# Patient Record
Sex: FEMALE | Race: BLACK OR AFRICAN AMERICAN | NOT HISPANIC OR LATINO | Employment: UNEMPLOYED
[De-identification: names, ages, dates, MRNs, and addresses within clinical notes are randomized per-mention and may not be internally consistent; named-entity substitution may affect disease eponyms.]

---

## 2017-02-07 ENCOUNTER — RECORDS - HEALTHEAST (OUTPATIENT)
Dept: ADMINISTRATIVE | Facility: OTHER | Age: 32
End: 2017-02-07

## 2017-03-03 ENCOUNTER — TRANSFERRED RECORDS (OUTPATIENT)
Dept: HEALTH INFORMATION MANAGEMENT | Facility: CLINIC | Age: 32
End: 2017-03-03

## 2017-03-03 LAB
ABO + RH BLD: NORMAL
ABO + RH BLD: NORMAL
BLD GP AB SCN SERPL QL: NORMAL
C TRACH DNA SPEC QL PROBE+SIG AMP: NORMAL
CULTURE MICRO: NORMAL
HBV SURFACE AG SERPL QL IA: NON REACTIVE
HEMOGLOBIN: 12.7 G/DL (ref 11.7–15.7)
HIV 1+2 AB+HIV1 P24 AG SERPL QL IA: NEGATIVE
N GONORRHOEA DNA SPEC QL PROBE+SIG AMP: NEGATIVE
RUBELLA ANTIBODY IGG QUANTITATIVE: NORMAL IU/ML
T PALLIDUM IGG SER QL: NON REACTIVE

## 2017-03-24 ENCOUNTER — TRANSFERRED RECORDS (OUTPATIENT)
Dept: HEALTH INFORMATION MANAGEMENT | Facility: CLINIC | Age: 32
End: 2017-03-24

## 2017-06-08 ENCOUNTER — TRANSFERRED RECORDS (OUTPATIENT)
Dept: HEALTH INFORMATION MANAGEMENT | Facility: CLINIC | Age: 32
End: 2017-06-08

## 2017-07-06 ENCOUNTER — TRANSFERRED RECORDS (OUTPATIENT)
Dept: HEALTH INFORMATION MANAGEMENT | Facility: CLINIC | Age: 32
End: 2017-07-06

## 2017-07-28 ENCOUNTER — OFFICE VISIT (OUTPATIENT)
Dept: OBGYN | Facility: CLINIC | Age: 32
End: 2017-07-28
Attending: OBSTETRICS & GYNECOLOGY
Payer: COMMERCIAL

## 2017-07-28 VITALS
DIASTOLIC BLOOD PRESSURE: 68 MMHG | BODY MASS INDEX: 34.02 KG/M2 | SYSTOLIC BLOOD PRESSURE: 106 MMHG | HEART RATE: 96 BPM | HEIGHT: 68 IN | WEIGHT: 224.5 LBS

## 2017-07-28 DIAGNOSIS — O34.219 PREVIOUS CESAREAN DELIVERY, ANTEPARTUM CONDITION OR COMPLICATION: ICD-10-CM

## 2017-07-28 DIAGNOSIS — O09.92 HIGH-RISK PREGNANCY IN SECOND TRIMESTER: ICD-10-CM

## 2017-07-28 PROCEDURE — 99212 OFFICE O/P EST SF 10 MIN: CPT | Mod: ZF

## 2017-07-28 ASSESSMENT — PAIN SCALES - GENERAL: PAINLEVEL: NO PAIN (0)

## 2017-07-28 NOTE — PROGRESS NOTES
Subjective   32 year old female who presents to clinic for initiation of OB care.   at 27w5d with estimated date of delivery of Oct 22, 2017 based on 1st trimester US.  Pregnancy is planned.  Presents today as a CHIDI without records which are requested today and arrive after Shruthi has departed clinic. Previous births at [facility]. Was seen for this IUP at a [facility] as was initially worked up there for recurrent pregnancy loss with normal labs.  History of previous c/s for failure to descent with 's x2.   Reviewed Co-morbids. Reviewed Genetics. Reviewed Medications. Reviewed dating ultrasound.        PERSONAL/SOCIAL HISTORY  Lives lives with their family.  Employment: Part time.  Her job involves moderate activity .  Additional items: Has been on WIC  Objective  -VS: reviewed and within normal limits   -General appearance: no acute distress, patient is comfortable   NEUROLOGICAL/PSYCHIATRIC   - Orientated x3,   -Mood and affect: : normal   Genetic/Infection questionnaire completed, risks include none    Assessment/Plan   at 27w5d  by 1st trimester US  Encounter Diagnoses   Name Primary?     High-risk pregnancy in second trimester      Previous  delivery, antepartum condition or complication          - Oriented to Practice, types of care, and how to reach a provider.   - Patient unable to complete 28 week labs today which were suggested. Agrees to labs at next visit.  - Tdap at next visit reviewed  - Patient was encouraged to continue prenatal vitamins as tolerated.    - Patient instructed to schedule EOB in 1 week and call prn questions or concerns    CARMEN Castrejon CNM

## 2017-07-28 NOTE — MR AVS SNAPSHOT
After Visit Summary   2017    Shruthi Bustos Victor Hugo    MRN: [id number]           Patient Information     Date Of Birth          1985        Visit Information        Provider Department      2017 10:30 AM Shanta Aguilera APRN CNM [facility]        Today's Diagnoses     High-risk pregnancy in second trimester        Previous  delivery, antepartum condition or complication           Follow-ups after your visit        Follow-up notes from your care team     Return in about 1 week (around 2017) for EOB, 30 min labs/pt ed.      Your next 10 appointments already scheduled     Aug 04, 2017  9:45 AM CDT   Return Obstetrics Extended with CARMEN Valentin CNM   [facility] ([facility])    [facility] [address]  [address]  [facility] [address] [phone]              Who to contact     Please call your clinic at [phone] to:    Ask questions about your health    Make or cancel appointments    Discuss your medicines    Learn about your test results    Speak to your doctor   If you have compliments or concerns about an experience at your clinic, or if you wish to file a complaint, please contact [facility] Patient Relations at [phone] or email us at [email or web address]         Additional Information About Your Visit        MyChart Information     CDP is an electronic gateway that provides easy, online access to your medical records. With CDP, you can request a clinic appointment, read your test results, renew a prescription or communicate with your care team.     To sign up for [email or web address] visit the website at [email or web address]   You will be asked to enter the access code listed below, as well as some personal information. Please follow the directions to create your username and password.     Your access code is:  [email or web address]  Expires: 10/12/2017  6:31 AM     Your access code will  in 90 days. If you need help or a new code, please contact your [facility] or call [phone] for assistance.        Care EveryWhere ID     This is your Care EveryWhere ID. This could be used by other organizations to access your [place] medical records  [id number]        Your Vitals Were     Pulse Height Last Period BMI (Body Mass Index)          96 1.727 m (5' 8\") 2017 34.14 kg/m2         Blood Pressure from Last 3 Encounters:   17 106/68   16 112/68   16 122/84    Weight from Last 3 Encounters:   17 101.8 kg (224 lb 8 oz)   06/12/15 98.4 kg (217 lb)   06/04/15 96.6 kg (213 lb)              Today, you had the following     No orders found for display       Primary Care Provider Office Phone # Fax #    Daniel Salazar [phone]       [facility]  [address]  [facility] [address]        Equal Access to Services     ZOFIA [facility] AH: Hadii feliberto Mccoy, wamanuelda lupashaadaha, qaybta kaalmada adearnoldyada, cordell ambrocio . So [facility] [phone].    ATENCIÓN: Si habla español, tiene a zurita disposición servicios gratuitos de asistencia lingüística. Llame al [phone].    We comply with applicable federal civil rights laws and [place] laws. We do not discriminate on the basis of race, color, national origin, age, disability sex, sexual orientation or gender identity.            Thank you!     Thank you for choosing [facility]  for your care. Our goal is always to provide you with excellent care. Hearing back from our patients is one way we can continue to improve our services. Please take a few minutes to complete the written survey that you may receive in the mail after your visit with us. Thank you!             Your Updated Medication List - Protect others around you: Learn how to safely use, store and " throw away your medicines at www.disposemymeds.org.          This list is accurate as of: 7/28/17  1:21 PM.  Always use your most recent med list.                   Brand Name Dispense Instructions for use Diagnosis    SM PRENATAL VITAMINS 28-0.8 MG Tabs     60 tablet    Take 1 tablet by mouth daily    Pregnant state, incidental       VITAMIN D (CHOLECALCIFEROL) PO      Take 400 Units by mouth daily

## 2017-07-28 NOTE — NURSING NOTE
Chief Complaint   Patient presents with     Prenatal Care     Transfer of care from [facility] in [place]       See ABDI Hargrove 7/28/2017

## 2017-07-28 NOTE — LETTER
2017       RE: Shruthi Gay  [address]    [place] [address]     Dear Colleague,    Thank you for referring your patient, Shruthi Gay, to the [facility] at [facility]. Please see a copy of my visit note below.    Subjective   32 year old female who presents to clinic for initiation of OB care.   at 27w5d with estimated date of delivery of Oct 22, 2017 based on 1st trimester US.  Pregnancy is planned.  Presents today as a CHIDI without records which are requested today and arrive after Shruthi has departed clinic. Previous births at . Was seen for this IUP at a [facility] as was initially worked up there for recurrent pregnancy loss with normal labs.  History of previous c/s for failure to descent with 's x2.   Reviewed Co-morbids. Reviewed Genetics. Reviewed Medications. Reviewed dating ultrasound.        PERSONAL/SOCIAL HISTORY  Lives lives with their family.  Employment: Part time.  Her job involves moderate activity .  Additional items: Has been on WIC  Objective  -VS: reviewed and within normal limits   -General appearance: no acute distress, patient is comfortable   NEUROLOGICAL/PSYCHIATRIC   - Orientated x3,   -Mood and affect: : normal   Genetic/Infection questionnaire completed, risks include none    Assessment/Plan   at 27w5d  by 1st trimester US  Encounter Diagnoses   Name Primary?     High-risk pregnancy in second trimester      Previous  delivery, antepartum condition or complication      - Oriented to Practice, types of care, and how to reach a provider.   - Patient unable to complete 28 week labs today which were suggested. Agrees to labs at next visit.  - Tdap at next visit reviewed  - Patient was encouraged to continue prenatal vitamins as tolerated.    - Patient instructed to schedule EOB in 1 week and call prn questions or concerns    CARMEN Castrejon CNM

## 2017-08-04 ENCOUNTER — OFFICE VISIT (OUTPATIENT)
Dept: OBGYN | Facility: CLINIC | Age: 32
End: 2017-08-04
Attending: OBSTETRICS & GYNECOLOGY
Payer: COMMERCIAL

## 2017-08-04 VITALS
DIASTOLIC BLOOD PRESSURE: 65 MMHG | BODY MASS INDEX: 33.65 KG/M2 | WEIGHT: 222 LBS | HEIGHT: 68 IN | SYSTOLIC BLOOD PRESSURE: 102 MMHG

## 2017-08-04 DIAGNOSIS — O09.93 HIGH-RISK PREGNANCY, THIRD TRIMESTER: Primary | ICD-10-CM

## 2017-08-04 DIAGNOSIS — Z23 NEED FOR TDAP VACCINATION: ICD-10-CM

## 2017-08-04 DIAGNOSIS — O36.63X0 LARGE FOR DATES AFFECTING MANAGEMENT OF MOTHER, THIRD TRIMESTER, NOT APPLICABLE OR UNSPECIFIED FETUS: ICD-10-CM

## 2017-08-04 DIAGNOSIS — O09.92 HIGH-RISK PREGNANCY IN SECOND TRIMESTER: ICD-10-CM

## 2017-08-04 PROBLEM — D69.6 THROMBOCYTOPENIA (H): Status: ACTIVE | Noted: 2017-08-04

## 2017-08-04 LAB
BASOPHILS # BLD AUTO: 0 10E9/L (ref 0–0.2)
BASOPHILS NFR BLD AUTO: 0 %
DEPRECATED CALCIDIOL+CALCIFEROL SERPL-MC: 21 UG/L (ref 20–75)
DIFFERENTIAL METHOD BLD: ABNORMAL
EOSINOPHIL # BLD AUTO: 0.1 10E9/L (ref 0–0.7)
EOSINOPHIL NFR BLD AUTO: 1.6 %
ERYTHROCYTE [DISTWIDTH] IN BLOOD BY AUTOMATED COUNT: 13.7 % (ref 10–15)
GLUCOSE 1H P 50 G GLC PO SERPL-MCNC: 102 MG/DL (ref 60–129)
HCT VFR BLD AUTO: 33.7 % (ref 35–47)
HGB BLD-MCNC: 11.4 G/DL (ref 11.7–15.7)
IMM GRANULOCYTES # BLD: 0 10E9/L (ref 0–0.4)
IMM GRANULOCYTES NFR BLD: 0.3 %
LYMPHOCYTES # BLD AUTO: 1.4 10E9/L (ref 0.8–5.3)
LYMPHOCYTES NFR BLD AUTO: 22.9 %
MCH RBC QN AUTO: 31.4 PG (ref 26.5–33)
MCHC RBC AUTO-ENTMCNC: 33.8 G/DL (ref 31.5–36.5)
MCV RBC AUTO: 93 FL (ref 78–100)
MONOCYTES # BLD AUTO: 0.5 10E9/L (ref 0–1.3)
MONOCYTES NFR BLD AUTO: 8.6 %
NEUTROPHILS # BLD AUTO: 4.2 10E9/L (ref 1.6–8.3)
NEUTROPHILS NFR BLD AUTO: 66.6 %
NRBC # BLD AUTO: 0 10*3/UL
NRBC BLD AUTO-RTO: 0 /100
PLATELET # BLD AUTO: 149 10E9/L (ref 150–450)
RBC # BLD AUTO: 3.63 10E12/L (ref 3.8–5.2)
T PALLIDUM IGG+IGM SER QL: NEGATIVE
WBC # BLD AUTO: 6.3 10E9/L (ref 4–11)

## 2017-08-04 PROCEDURE — 90715 TDAP VACCINE 7 YRS/> IM: CPT | Mod: ZF

## 2017-08-04 PROCEDURE — 86780 TREPONEMA PALLIDUM: CPT | Performed by: ADVANCED PRACTICE MIDWIFE

## 2017-08-04 PROCEDURE — 82950 GLUCOSE TEST: CPT | Performed by: ADVANCED PRACTICE MIDWIFE

## 2017-08-04 PROCEDURE — 82306 VITAMIN D 25 HYDROXY: CPT | Performed by: ADVANCED PRACTICE MIDWIFE

## 2017-08-04 PROCEDURE — 85025 COMPLETE CBC W/AUTO DIFF WBC: CPT | Performed by: ADVANCED PRACTICE MIDWIFE

## 2017-08-04 PROCEDURE — 36415 COLL VENOUS BLD VENIPUNCTURE: CPT | Performed by: ADVANCED PRACTICE MIDWIFE

## 2017-08-04 PROCEDURE — 99211 OFF/OP EST MAY X REQ PHY/QHP: CPT | Mod: 25

## 2017-08-04 PROCEDURE — 90471 IMMUNIZATION ADMIN: CPT

## 2017-08-04 PROCEDURE — 25000128 H RX IP 250 OP 636: Mod: ZF

## 2017-08-04 ASSESSMENT — ANXIETY QUESTIONNAIRES
6. BECOMING EASILY ANNOYED OR IRRITABLE: NOT AT ALL
5. BEING SO RESTLESS THAT IT IS HARD TO SIT STILL: NOT AT ALL
GAD7 TOTAL SCORE: 0
3. WORRYING TOO MUCH ABOUT DIFFERENT THINGS: NOT AT ALL
IF YOU CHECKED OFF ANY PROBLEMS ON THIS QUESTIONNAIRE, HOW DIFFICULT HAVE THESE PROBLEMS MADE IT FOR YOU TO DO YOUR WORK, TAKE CARE OF THINGS AT HOME, OR GET ALONG WITH OTHER PEOPLE: NOT DIFFICULT AT ALL
1. FEELING NERVOUS, ANXIOUS, OR ON EDGE: NOT AT ALL
7. FEELING AFRAID AS IF SOMETHING AWFUL MIGHT HAPPEN: NOT AT ALL
2. NOT BEING ABLE TO STOP OR CONTROL WORRYING: NOT AT ALL

## 2017-08-04 ASSESSMENT — PAIN SCALES - GENERAL: PAINLEVEL: NO PAIN (0)

## 2017-08-04 ASSESSMENT — PATIENT HEALTH QUESTIONNAIRE - PHQ9: 5. POOR APPETITE OR OVEREATING: NOT AT ALL

## 2017-08-04 NOTE — PROGRESS NOTES
SUBJECTIVE:    32 year old, female, , 28w4d,  who presents to the clinic today for a new ob visit.  Feels well. Has started PNV. Denies leaking of fluid, vaginal bleeding and contractions. Normal daily fetal movent. Denies HA, vision changes, RUQ pain, nausea and vomiting.   Estimated Date of Delivery: Oct 23, 2017   Oct 23, 2017 is calculated from 9 week ultrasound.     OTHER CONCERNS: None, reports feeling well.   ===========================================  ROS negative.   PSYCHIATRIC:  Denies mood changes, difficulty concentrating, depression, anxiety, panic attacks or post partum depression  PHQ9: Last PHQ-9 score on record= 0  Social History   Substance Use Topics     Smoking status: Never Smoker     Smokeless tobacco: Never Used     Alcohol use No     History   Drug Use No     History   Smoking Status     Never Smoker   Smokeless Tobacco     Never Used       Alcohol use No     Family History   Problem Relation Age of Onset     Family history unknown: Yes     ============================================  MEDICAL HISTORY   No Known Allergies        Current Outpatient Prescriptions:      Prenatal Vit-Fe Fumarate-FA ( PRENATAL VITAMINS) 28-0.8 MG TABS, Take 1 tablet by mouth daily, Disp: 60 tablet, Rfl: 3     VITAMIN D, CHOLECALCIFEROL, PO, Take 400 Units by mouth daily, Disp: , Rfl:     Past Medical History:   Diagnosis Date     NO ACTIVE PROBLEMS        Past Surgical History:   Procedure Laterality Date      SECTION              Obstetric History       T3      L3     SAB0   TAB0   Ectopic0   Multiple0   Live Births3       # Outcome Date GA Lbr Ezekiel/2nd Weight Sex Delivery Anes PTL Lv   7 Current            6 Term 12 39w4d 13:37 / 00:36 3.374 kg (7 lb 7 oz) F  None N CHENCHO      Name: KESHAALBANIA      Apgar1:  9                Apgar5: 9   5 Term 02/25/10 38w0d   F   N CHENCHO   4 Term 09 39w0d   M CS-Unspec  N CHENCHO   3 SAB            2 SAB            1 SAB      "          Obstetric Comments   Patient had bleeding with abdominal cramping starting about 45 minutes ago, shortly before midnight.         GYN History- Abnormal Pap Smears: none per pt                        Cervical procedures: none                        History of STI: none.    I personally reviewed the past social/family/medical and surgical history on the date of service.   I reviewed lab work done at Intake visit with patient.    OBJECTIVE:   PHYSICAL EXAM:  /65  Ht 1.727 m (5' 7.99\")  Wt 100.7 kg (222 lb)  LMP 2017  BMI 33.76 kg/m2  BMI- Body mass index is 33.76 kg/(m^2)., GENERAL:  Pleasant pregnant female, alert, cooperative and well groomed.  SKIN:  Warm and dry, without lesions or rashes  HEAD: Symmetrical features.  MOUTH:  Buccal mucosa pink, moist without lesions.  Teeth in good repair.    NECK:  Thyroid without enlargement and nodules.  Lymph nodes not palpable.   LUNGS:  Clear to auscultation.  HEART:  RRR without murmur.  ABDOMEN: Soft without masses , tenderness or organomegaly.  No CVA tenderness.  Uterus palpable at size greater than dates.  Well healed  scar noted. Fetal heart tones present.  MUSCULOSKELETAL:  Full range of motion  EXTREMITIES:  No edema. No significant varicosities.   PELVIC EXAM: deferred  GC/CHLAMYDIA CULTURE OBTAINED:NO, neg/neg 3/2017    ASSESSMENT:  Intrauterine pregnancy 28w4d size greater than dates  Genetic Screening: declined     PLAN:  Orders Placed This Encounter   Procedures     Growth Ultrasound 37420     TDAP VACCINE (BOOSTRIX) >/= 27 weeks     Glucose tolerance gest screen 1 hour     CBC with Platelets Differential [WGR237]     25- OH-Vitamin D     Anti Treponema [[id number]]       - Reviewed use of triage nurse line and contacting the on-call provider after hours for an urgent need such as fever, vagina bleeding, bladder or vaginal infection, rupture of membranes,  or term labor.    - Reviewed best evidence for: weight gain for her " weight and height for pregnancy:  RECOMMENDED WEIGHT GAIN: < 15 lbs.  -Reviewed healthy diet and foods to avoid; exercise and activity during pregnancy;avoiding exposure to toxoplasmosis; and maintenance of a generally healthy lifestyle.   -EOB Education completed today includes breast feeding, [facility] hand out , contraception, counting movements, signs of pre-term labor, when to present to birthplace, post partum depression, GBS, getting enough iron and labor induction.  -Birth preferences reviewed: Un-Medicated, Labor support:  Family,  Feeding plans : Breastfeed, Contraception planned:  unsure  -Water birth consent form was not given.  - All pt's questions discussed and answered.  Pt verbalized understanding of and agreement to plan of care.   - RTC asap for growth ultrasound (pt unable to complete today), then 3-4 weeks for VINAYAK. Please review TOLAC consent at next visit.     CARMEN Hill CNM

## 2017-08-04 NOTE — MR AVS SNAPSHOT
"              After Visit Summary   8/4/2017    Shruthi Bustos Victor Hugo    MRN: [id number]           Patient Information     Date Of Birth          1985        Visit Information        Provider Department      8/4/2017 9:45 AM Ryanne Vigil APRN CNM [facility]        Today's Diagnoses     High-risk pregnancy, third trimester    -  1    Need for Tdap vaccination        Large for dates affecting management of mother, third trimester, not applicable or unspecified fetus        High-risk pregnancy in second trimester          Care Instructions      Thank you for choosing [facility] for your obstetrical care.  We are an integrated health clinic with obstetricians, midwives, a psychologist, an acupuncturist, a nutritionist, a pharmacist, internal medicine and family practice all in one.  If you have questions about services offered please ask.      o Please keep all appointments with your provider.  You will help catch, prevent and treat problems early.  o Review your Expectant Family booklet and folder given to you at this intake visit.  It can answer many basic questions including:    Treatment for nausea and vomiting    Medications that are safe in pregnancy    Healthy diet and weight gain    Exercise and activity  o ASK questions!!  Please use \"Questions for my New OB visit\" form to write down any questions or concerns for your next visit.  o Eat a healthy diet.  Visit www.choosemyplate.gov and click on  Pregnancy and Breastfeeding  for information and tips  o Do not smoke.  Avoid other people's smoke, too.  We are happy to help with referrals to stop smoking programs.  o Do not drink alcohol.  o Try to avoid people who have colds or other infections.  Practice good hand washing.  o Consider registering for our Healthy Pregnancy Class here at [facility].  This class is offered every 3rd Wednesday from 2:30-4:30 p.m.  [place] at [phone] or online at " [email or web address] or [email or web address]  o Consider registering for prenatal education classes through [employer] at [facility].  You can view class schedules and register online at [email or web address] or call [phone] for questions     For urgent concerns, call [facility] at [phone] to speak with a triage nurse during regular clinic hours (8:00 am - 4:30 pm).  This line is answered by our service 24 hours a day, after hours a provider will return your call.          Follow-ups after your visit        Follow-up notes from your care team     Return in about 4 weeks (around 9/1/2017) for Return OB.      Your next 10 appointments already scheduled     Aug 11, 2017 11:00 AM CDT   ULTRASOUND with [facility] ULTRASOUND   [facility] ([facility])    [facility] [address]  [address]  [facility] [address] [phone]            Sep 01, 2017 11:00 AM CDT   RETURN OB with CARMEN Narvaez CNM   [facility] ([facility])    [facility] [address]  [address]  [facility] [address] [phone]              Future tests that were ordered for you today     Open Future Orders        Priority Expected Expires Ordered    Growth Ultrasound 89163 Routine  12/2/2017 8/4/2017            Who to contact     Please call your clinic at [phone] to:    Ask questions about your health    Make or cancel appointments    Discuss your medicines    Learn about your test results    Speak to your doctor   If you have compliments or concerns about an experience at your clinic, or if you wish to file a complaint, please contact [facility] Patient Relations at [phone] or email us at [email or web address]         Additional Information About Your Visit        Care EveryWhere ID     This is your Care EveryWhere ID. This  "could be used by other organizations to access your [place] medical records  [id number]        Your Vitals Were     Height Last Period BMI (Body Mass Index)             1.727 m (5' 7.99\") 01/22/2017 33.76 kg/m2          Blood Pressure from Last 3 Encounters:   08/04/17 102/65   07/28/17 106/68   03/12/16 112/68    Weight from Last 3 Encounters:   08/04/17 100.7 kg (222 lb)   07/28/17 101.8 kg (224 lb 8 oz)   06/12/15 98.4 kg (217 lb)              We Performed the Following     25- OH-Vitamin D     Anti Treponema [[id number]]     CBC with Platelets Differential [[id number]]     Glucose tolerance gest screen 1 hour     TDAP VACCINE (BOOSTRIX) >/= 27 weeks        Primary Care Provider Office Phone # Fax #    Daniel Salazar [phone]       [facility] [address] [facility] [address]        Equal Access to Services     CARMELINA MONAE : Hadii aad ku hadasho Soomaali, waaxda luqadaha, qaybta kaalmada adeegyada, waxay karen ambrocio . So [facility] [phone].    ATENCIÓN: Si habla español, tiene a zurita disposición servicios gratuitos de asistencia lingüística. Jagrutiame al [phone].    We comply with applicable federal civil rights laws and [place] laws. We do not discriminate on the basis of race, color, national origin, age, disability sex, sexual orientation or gender identity.            Thank you!     Thank you for choosing [facility]  for your care. Our goal is always to provide you with excellent care. Hearing back from our patients is one way we can continue to improve our services. Please take a few minutes to complete the written survey that you may receive in the mail after your visit with us. Thank you!             Your Updated Medication List - Protect others around you: Learn how to safely use, store and throw away your medicines at www.disposemymeds.org.          This list is accurate as of: 8/4/17 12:11 PM.  Always use your most recent med list. "                   Brand Name Dispense Instructions for use Diagnosis    SM PRENATAL VITAMINS 28-0.8 MG Tabs     60 tablet    Take 1 tablet by mouth daily    Pregnant state, incidental       VITAMIN D (CHOLECALCIFEROL) PO      Take 400 Units by mouth daily

## 2017-08-04 NOTE — LETTER
2017       RE: Shruthi Gay  [address]    [place] [address]     Dear Colleague,    Thank you for referring your patient, Shruthi Gay, to the [facility] at [facility]. Please see a copy of my visit note below.    SUBJECTIVE:    32 year old, female, , 28w4d,  who presents to the clinic today for a new ob visit.  Feels well. Has started PNV. Denies leaking of fluid, vaginal bleeding and contractions. Normal daily fetal movent. Denies HA, vision changes, RUQ pain, nausea and vomiting.   Estimated Date of Delivery: Oct 23, 2017   Oct 23, 2017 is calculated from 9 week ultrasound.     OTHER CONCERNS: None, reports feeling well.   ===========================================  ROS negative.   PSYCHIATRIC:  Denies mood changes, difficulty concentrating, depression, anxiety, panic attacks or post partum depression  PHQ9: Last PHQ-9 score on record= 0  Social History   Substance Use Topics     Smoking status: Never Smoker     Smokeless tobacco: Never Used     Alcohol use No     History   Drug Use No     History   Smoking Status     Never Smoker   Smokeless Tobacco     Never Used     Alcohol use No     Family History   Problem Relation Age of Onset     Family history unknown: Yes     ============================================  MEDICAL HISTORY   No Known Allergies    Current Outpatient Prescriptions:      Prenatal Vit-Fe Fumarate-FA ( PRENATAL VITAMINS) 28-0.8 MG TABS, Take 1 tablet by mouth daily, Disp: 60 tablet, Rfl: 3     VITAMIN D, CHOLECALCIFEROL, PO, Take 400 Units by mouth daily, Disp: , Rfl:     Past Medical History:   Diagnosis Date     NO ACTIVE PROBLEMS      Past Surgical History:   Procedure Laterality Date      SECTION       Obstetric History       T3      L3     SAB0   TAB0   Ectopic0   Multiple0   Live Births3       # Outcome Date GA Lbr Ezekiel/2nd Weight Sex Delivery Anes PTL Lv   7  "Current            6 Term 12 39w4d 13:37 / 00:36 3.374 kg (7 lb 7 oz) F  None N CHENCHO      Name: ALBANIA REBOLLEDO      Apgar1:  9                Apgar5: 9   5 Term 02/25/10 38w0d   F   N CHENCHO   4 Term 09 39w0d   M CS-Unspec  N CHENCHO   3 SAB            2 SAB            1 SAB               Obstetric Comments   Patient had bleeding with abdominal cramping starting about 45 minutes ago, shortly before midnight.       GYN History- Abnormal Pap Smears: none per pt                        Cervical procedures: none                        History of STI: none.    I personally reviewed the past social/family/medical and surgical history on the date of service.   I reviewed lab work done at Intake visit with patient.    OBJECTIVE:   PHYSICAL EXAM:  /65  Ht 1.727 m (5' 7.99\")  Wt 100.7 kg (222 lb)  LMP 2017  BMI 33.76 kg/m2  BMI- Body mass index is 33.76 kg/(m^2)., GENERAL:  Pleasant pregnant female, alert, cooperative and well groomed.  SKIN:  Warm and dry, without lesions or rashes  HEAD: Symmetrical features.  MOUTH:  Buccal mucosa pink, moist without lesions.  Teeth in good repair.    NECK:  Thyroid without enlargement and nodules.  Lymph nodes not palpable.   LUNGS:  Clear to auscultation.  HEART:  RRR without murmur.  ABDOMEN: Soft without masses , tenderness or organomegaly.  No CVA tenderness.  Uterus palpable at size greater than dates.  Well healed  scar noted. Fetal heart tones present.  MUSCULOSKELETAL:  Full range of motion  EXTREMITIES:  No edema. No significant varicosities.   PELVIC EXAM: deferred  GC/CHLAMYDIA CULTURE OBTAINED:NO, neg/neg 3/2017    ASSESSMENT:  Intrauterine pregnancy 28w4d size greater than dates  Genetic Screening: declined     PLAN:  Orders Placed This Encounter   Procedures     Growth Ultrasound 03426     TDAP VACCINE (BOOSTRIX) >/= 27 weeks     Glucose tolerance gest screen 1 hour     CBC with Platelets Differential [ATT404]     25- OH-Vitamin D     " Anti Treponema [DTY7872]       - Reviewed use of triage nurse line and contacting the on-call provider after hours for an urgent need such as fever, vagina bleeding, bladder or vaginal infection, rupture of membranes,  or term labor.    - Reviewed best evidence for: weight gain for her weight and height for pregnancy:  RECOMMENDED WEIGHT GAIN: < 15 lbs.  -Reviewed healthy diet and foods to avoid; exercise and activity during pregnancy;avoiding exposure to toxoplasmosis; and maintenance of a generally healthy lifestyle.   -EOB Education completed today includes breast feeding, [facility] hand out , contraception, counting movements, signs of pre-term labor, when to present to birthplace, post partum depression, GBS, getting enough iron and labor induction.  -Birth preferences reviewed: Un-Medicated, Labor support:  Family,  Feeding plans : Breastfeed, Contraception planned:  unsure  -Water birth consent form was not given.  - All pt's questions discussed and answered.  Pt verbalized understanding of and agreement to plan of care.   - RTC asap for growth ultrasound (pt unable to complete today), then 3-4 weeks for VINAYAK. Please review TOLAC consent at next visit.     CARMEN Hill CNM

## 2017-08-04 NOTE — PATIENT INSTRUCTIONS
"  Thank you for choosing [facility] for your obstetrical care.  We are an integrated health clinic with obstetricians, midwives, a psychologist, an acupuncturist, a nutritionist, a pharmacist, internal medicine and family practice all in one.  If you have questions about services offered please ask.      o Please keep all appointments with your provider.  You will help catch, prevent and treat problems early.  o Review your Expectant Family booklet and folder given to you at this intake visit.  It can answer many basic questions including:    Treatment for nausea and vomiting    Medications that are safe in pregnancy    Healthy diet and weight gain    Exercise and activity  o ASK questions!!  Please use \"Questions for my New OB visit\" form to write down any questions or concerns for your next visit.  o Eat a healthy diet.  Visit www.choosemyplate.gov and click on  Pregnancy and Breastfeeding  for information and tips  o Do not smoke.  Avoid other people's smoke, too.  We are happy to help with referrals to stop smoking programs.  o Do not drink alcohol.  o Try to avoid people who have colds or other infections.  Practice good hand washing.  o Consider registering for our Healthy Pregnancy Class here at [facility].  This class is offered every 3rd Wednesday from 2:30-4:30 p.m.  [place] at [phone] or online at [email or web address]  o Consider registering for prenatal education classes through [place] at [facility].  You can view class schedules and register online at [email or web address] or call [phone] for questions     For urgent concerns, call [facility] at [phone] to speak with a triage nurse during regular clinic hours (8:00 am - 4:30 pm).  This line is answered by our service 24 hours a day, after hours a provider will return your call.  "

## 2017-08-05 ASSESSMENT — PATIENT HEALTH QUESTIONNAIRE - PHQ9: SUM OF ALL RESPONSES TO PHQ QUESTIONS 1-9: 0

## 2017-08-05 ASSESSMENT — ANXIETY QUESTIONNAIRES: GAD7 TOTAL SCORE: 0

## 2017-08-11 ENCOUNTER — OFFICE VISIT (OUTPATIENT)
Dept: OBGYN | Facility: CLINIC | Age: 32
End: 2017-08-11
Attending: MIDWIFE
Payer: COMMERCIAL

## 2017-08-11 DIAGNOSIS — O36.63X0 LARGE FOR DATES AFFECTING MANAGEMENT OF MOTHER, THIRD TRIMESTER, NOT APPLICABLE OR UNSPECIFIED FETUS: ICD-10-CM

## 2017-08-11 PROCEDURE — 76816 OB US FOLLOW-UP PER FETUS: CPT | Mod: ZF

## 2017-08-11 NOTE — PROGRESS NOTES
32 year old female, , presents at 29 4/7 weeks for obstetric ultrasound assessment indicated by size greater than dates.    Single fetus    Presentation - cephalic    USEGA = 31 3/7 weeks.  EFW = 1,683 grams, 84 % for 29 weeks.      NICHO = 14.1cm.  FHR = 147bpm     Placenta anterior and grade 0      Findings discussed with patient.    Further studies as clinically indicated.    REBEKAH Arboleda MD MPH

## 2017-08-11 NOTE — LETTER
2017       RE: Shruthi Gay  [address]    [place] [address]     Dear Colleague,    Thank you for referring your patient, Shruthi Gay, to the [facility] at [facility]. Please see a copy of my visit note below.    32 year old female, , presents at 29 4/7 weeks for obstetric ultrasound assessment indicated by size greater than dates.    Single fetus    Presentation - cephalic    USEGA = 31 3/7 weeks.  EFW = 1,683 grams, 84 % for 29 weeks.      NICHO = 14.1cm.  FHR = 147bpm     Placenta anterior and grade 0      Findings discussed with patient.    Further studies as clinically indicated.    REBEKAH Arboleda MD MPH      Again, thank you for allowing me to participate in the care of your patient.      Sincerely,    [facility]

## 2017-08-11 NOTE — MR AVS SNAPSHOT
After Visit Summary   8/11/2017    Shruthi Bustos Victor Hugo    MRN: [id number]           Patient Information     Date Of Birth          1985        Visit Information        Provider Department      8/11/2017 11:00 AM [facility] ULTRASOUND [facility]        Today's Diagnoses     Large for dates affecting management of mother, third trimester, not applicable or unspecified fetus           Follow-ups after your visit        Your next 10 appointments already scheduled     Sep 01, 2017 11:00 AM CDT   RETURN OB with CARMEN Narvaez CNM   [facility] ([facility])    [facility] [address]  [address] [facility] [address] [phone]              Who to contact     Please call your clinic at [phone] to:    Ask questions about your health    Make or cancel appointments    Discuss your medicines    Learn about your test results    Speak to your doctor   If you have compliments or concerns about an experience at your clinic, or if you wish to file a complaint, please contact [facility] Patient Relations at [phone] or email us at [email or web address]         Additional Information About Your Visit        Care EveryWhere ID     This is your Care EveryWhere ID. This could be used by other organizations to access your [facility] medical records  [id number]        Your Vitals Were     Last Period                   01/22/2017            Blood Pressure from Last 3 Encounters:   08/04/17 102/65   07/28/17 106/68   03/12/16 112/68    Weight from Last 3 Encounters:   08/04/17 100.7 kg (222 lb)   07/28/17 101.8 kg (224 lb 8 oz)   06/12/15 98.4 kg (217 lb)              We Performed the Following     Growth Ultrasound [address]4544        Primary Care Provider Office Phone # Fax #    Daniel Salazar [phone]       [facility] [address] [facility] [address]        Equal  Access to Services     [facility]: Hadii aad ku hadrafythai Antoinetteray, wamanuelda luqadaha, qamarilyn pashabetsycordell thakkar. So [facility] [phone].    ATENCIÓN: Si habla español, tiene a zurita disposición servicios gratuitos de asistencia lingüística. Llame al [phone].    We comply with applicable federal civil rights laws and [place] laws. We do not discriminate on the basis of race, color, national origin, age, disability sex, sexual orientation or gender identity.            Thank you!     Thank you for choosing [facility]  for your care. Our goal is always to provide you with excellent care. Hearing back from our patients is one way we can continue to improve our services. Please take a few minutes to complete the written survey that you may receive in the mail after your visit with us. Thank you!             Your Updated Medication List - Protect others around you: Learn how to safely use, store and throw away your medicines at www.disposemymeds.org.          This list is accurate as of: 8/11/17 12:59 PM.  Always use your most recent med list.                   Brand Name Dispense Instructions for use Diagnosis    SM PRENATAL VITAMINS 28-0.8 MG Tabs     60 tablet    Take 1 tablet by mouth daily    Pregnant state, incidental       VITAMIN D (CHOLECALCIFEROL) PO      Take 400 Units by mouth daily

## 2017-09-06 ENCOUNTER — OFFICE VISIT (OUTPATIENT)
Dept: OBGYN | Facility: CLINIC | Age: 32
End: 2017-09-06
Attending: ADVANCED PRACTICE MIDWIFE
Payer: COMMERCIAL

## 2017-09-06 VITALS
SYSTOLIC BLOOD PRESSURE: 99 MMHG | BODY MASS INDEX: 35.77 KG/M2 | WEIGHT: 236 LBS | HEIGHT: 68 IN | HEART RATE: 97 BPM | DIASTOLIC BLOOD PRESSURE: 65 MMHG

## 2017-09-06 DIAGNOSIS — Z33.1 PREGNANT STATE, INCIDENTAL: ICD-10-CM

## 2017-09-06 DIAGNOSIS — O09.92 HIGH-RISK PREGNANCY IN SECOND TRIMESTER: Primary | ICD-10-CM

## 2017-09-06 DIAGNOSIS — D69.6 THROMBOCYTOPENIA (H): ICD-10-CM

## 2017-09-06 PROCEDURE — 99212 OFFICE O/P EST SF 10 MIN: CPT | Mod: ZF

## 2017-09-06 RX ORDER — ACETAMINOPHEN 325 MG/1
650 TABLET ORAL EVERY 6 HOURS PRN
Qty: 100 TABLET | Refills: 0 | Status: SHIPPED | OUTPATIENT
Start: 2017-09-06

## 2017-09-06 RX ORDER — GLYCERIN/MINERAL OIL
1 LOTION (ML) TOPICAL DAILY
Qty: 60 TABLET | Refills: 3 | Status: SHIPPED | OUTPATIENT
Start: 2017-09-06

## 2017-09-06 ASSESSMENT — PAIN SCALES - GENERAL: PAINLEVEL: NO PAIN (0)

## 2017-09-06 NOTE — LETTER
"2017     RE: Shruthi Gay  [address]    [place] [address]     Dear Colleague,    Thank you for referring your patient, Shruthi Gay, to the [facility] at [facility]. Please see a copy of my visit note below.    Subjective:      32 year old  at 33w2d presents for a routine prenatal appointment.   Denies vaginal bleeding or leakage of fluid.  Denies contractions. + fetal movement.      No HA, visual changes, RUQ or epigastric pain.   Patient concerns: reports lower back pain, improved with massage.  Reviewed EOB labs with patient- low platelets discussed.    Objective:  Vitals:    17 1516   BP: 99/65   Pulse: 97   Weight: 107 kg (236 lb)   Height: 1.727 m (5' 8\")   , see ob flowsheet    Assessment/Plan:   Encounter Diagnoses   Name Primary?     High-risk pregnancy in third trimester Yes     Pregnant state, incidental      Orders Placed This Encounter   Medications     Prenatal Vit-Fe Fumarate-FA ( PRENATAL VITAMINS) 28-0.8 MG TABS     Sig: Take 1 tablet by mouth daily     Dispense:  60 tablet     Refill:  3     order for DME     Sig: Maternity Belt order     Dispense:  1 each     Refill:  0     acetaminophen (TYLENOL) 325 MG tablet     Sig: Take 2 tablets (650 mg) by mouth every 6 hours as needed for mild pain     Dispense:  100 tablet     Refill:  0     ABO   Date Value Ref Range Status   2017 A  Final     RH(D)   Date Value Ref Range Status   2017 Pos  Final     Antibody Screen   Date Value Ref Range Status   2017 neg  Final     - Discussed heating pad, maternity support belt, warm baths for lower back pain.   - Reviewed total weight gain, encouraged continued healthy diet and exercise.    - Reviewed importance of daily fetal kick count and why/how to contact provider.  - Reviewed why/how to contact provider if headache/visual changes/RUQ or epigastric pain, decreased fetal movement, " vaginal bleeding, leakage of fluid or more than 4 contractions in an hour.  - Pt declines repeating platelets at this visit due to time restriction, will do at next visit.  - Growth ultrasound at next visit   - Needs TOLAC consent at next visit  - Return to clinic in 2 weeks and prn if questions or concerns.    Again, thank you for allowing me to participate in the care of your patient.      Sincerely,    CARMEN Ivey CNM

## 2017-09-06 NOTE — MR AVS SNAPSHOT
"              After Visit Summary   9/6/2017    Shruthi Bustos Victor Hugo    MRN: [id number]           Patient Information     Date Of Birth          1985        Visit Information        Provider Department      9/6/2017 3:00 PM Holland Logan APRN CN [facility]        Today's Diagnoses     High-risk pregnancy in third trimester    -  1    Pregnant state, incidental           Follow-ups after your visit        Your next 10 appointments already scheduled     Sep 15, 2017 10:00 AM CDT   ULTRASOUND with [facility] ULTRASOUND   [facility] ([facility])    [facility] [address]  [address]  [facility] [address] [phone]            Sep 22, 2017 10:00 AM CDT   RETURN OB with CARMEN Santoyo CNM   [facility] ([facility])    [facility] [address]  [address]  [facility] [address] [phone]              Who to contact     Please call your clinic at [phone] to:    Ask questions about your health    Make or cancel appointments    Discuss your medicines    Learn about your test results    Speak to your doctor   If you have compliments or concerns about an experience at your clinic, or if you wish to file a complaint, please contact [facility] Patient Relations at [phone] or email us at [email or web address]         Additional Information About Your Visit        Care EveryWhere ID     This is your Care EveryWhere ID. This could be used by other organizations to access your [facility] medical records  [id number]        Your Vitals Were     Pulse Height Last Period BMI (Body Mass Index)          97 1.727 m (5' 8\") 01/22/2017 35.88 kg/m2         Blood Pressure from Last 3 Encounters:   09/06/17 99/65   08/04/17 102/65   07/28/17 106/68    Weight from Last 3 Encounters:   09/06/17 107 kg (236 lb)   08/04/17 100.7 kg (222 " lb)   07/28/17 101.8 kg (224 lb 8 oz)              Today, you had the following     No orders found for display         Today's Medication Changes          These changes are accurate as of: 9/6/17  3:44 PM.  If you have any questions, ask your nurse or doctor.               Start taking these medicines.        Dose/Directions    acetaminophen 325 MG tablet   Commonly known as:  TYLENOL   Used for:  High-risk pregnancy in second trimester   Started by:  Holland Logan APRN CNM        Dose:  650 mg   Take 2 tablets (650 mg) by mouth every 6 hours as needed for mild pain   Quantity:  100 tablet   Refills:  0       order for DME   Used for:  High-risk pregnancy in second trimester   Started by:  Holland Logan APRN CNM        Maternity Belt order   Quantity:  1 each   Refills:  0            Where to get your medicines      These medications were sent to [facility] [address] [place] [address] ([place]) [place] [address]     Phone:  [phone]     acetaminophen 325 MG tablet    SM PRENATAL VITAMINS 28-0.8 MG Tabs         Some of these will need a paper prescription and others can be bought over the counter.  Ask your nurse if you have questions.     Bring a paper prescription for each of these medications     order for DME                Primary Care Provider Office Phone # Fax #    Daniel Salazar [phone]       [facility] [address] [facility] [address]        Equal Access to Services     [facility]: Hadii feliberto singletaryo Soray, waaxda luqadaha, qaybta kaalmada adeegyada, cordell ambrocio . So [facility] [phone].    ATENCIÓN: Si habla español, tiene a zurita disposición servicios gratuitos de asistencia lingüística. Llame al [phone].    We comply with applicable federal civil rights laws and [place] laws. We do not discriminate on the basis of race, color, national origin, age, disability  sex, sexual orientation or gender identity.            Thank you!     Thank you for choosing [facility]  for your care. Our goal is always to provide you with excellent care. Hearing back from our patients is one way we can continue to improve our services. Please take a few minutes to complete the written survey that you may receive in the mail after your visit with us. Thank you!             Your Updated Medication List - Protect others around you: Learn how to safely use, store and throw away your medicines at www.disposemymeds.org.          This list is accurate as of: 9/6/17  3:44 PM.  Always use your most recent med list.                   Brand Name Dispense Instructions for use Diagnosis    acetaminophen 325 MG tablet    TYLENOL    100 tablet    Take 2 tablets (650 mg) by mouth every 6 hours as needed for mild pain    High-risk pregnancy in second trimester       order for DME     1 each    Maternity Belt order    High-risk pregnancy in second trimester       SM PRENATAL VITAMINS 28-0.8 MG Tabs     60 tablet    Take 1 tablet by mouth daily    Pregnant state, incidental

## 2017-09-06 NOTE — PROGRESS NOTES
"Subjective:      32 year old  at 33w2d presents for a routine prenatal appointment.   Denies vaginal bleeding or leakage of fluid.  Denies contractions. + fetal movement.      No HA, visual changes, RUQ or epigastric pain.   Patient concerns: reports lower back pain, improved with massage.  Reviewed EOB labs with patient- low platelets discussed.    Objective:  Vitals:    17 1516   BP: 99/65   Pulse: 97   Weight: 107 kg (236 lb)   Height: 1.727 m (5' 8\")   , see ob flowsheet    Assessment/Plan:   Encounter Diagnoses   Name Primary?     High-risk pregnancy in third trimester Yes     Pregnant state, incidental      Orders Placed This Encounter   Medications     Prenatal Vit-Fe Fumarate-FA ( PRENATAL VITAMINS) 28-0.8 MG TABS     Sig: Take 1 tablet by mouth daily     Dispense:  60 tablet     Refill:  3     order for DME     Sig: Maternity Belt order     Dispense:  1 each     Refill:  0     acetaminophen (TYLENOL) 325 MG tablet     Sig: Take 2 tablets (650 mg) by mouth every 6 hours as needed for mild pain     Dispense:  100 tablet     Refill:  0     ABO   Date Value Ref Range Status   2017 A  Final     RH(D)   Date Value Ref Range Status   2017 Pos  Final     Antibody Screen   Date Value Ref Range Status   2017 neg  Final     - Discussed heating pad, maternity support belt, warm baths for lower back pain.   - Reviewed total weight gain, encouraged continued healthy diet and exercise.    - Reviewed importance of daily fetal kick count and why/how to contact provider.  - Reviewed why/how to contact provider if headache/visual changes/RUQ or epigastric pain, decreased fetal movement, vaginal bleeding, leakage of fluid or more than 4 contractions in an hour.  - Pt declines repeating platelets at this visit due to time restriction, will do at next visit.  - Growth ultrasound at next visit   - Needs TOLAC consent at next visit  - Return to clinic in 2 weeks and prn if questions or concerns.  "

## 2017-09-15 ENCOUNTER — OFFICE VISIT (OUTPATIENT)
Dept: OBGYN | Facility: CLINIC | Age: 32
End: 2017-09-15
Attending: ADVANCED PRACTICE MIDWIFE
Payer: COMMERCIAL

## 2017-09-15 DIAGNOSIS — O09.92 HIGH-RISK PREGNANCY IN SECOND TRIMESTER: ICD-10-CM

## 2017-09-15 PROCEDURE — 76816 OB US FOLLOW-UP PER FETUS: CPT | Mod: ZF

## 2017-09-15 NOTE — MR AVS SNAPSHOT
After Visit Summary   9/15/2017    Shruthi Bustos Victor Hugo    MRN: [id number]           Patient Information     Date Of Birth          1985        Visit Information        Provider Department      9/15/2017 10:00 AM [facility] ULTRASOUND [facility]        Today's Diagnoses     High-risk pregnancy in third trimester           Follow-ups after your visit        Your next 10 appointments already scheduled     Sep 22, 2017 10:00 AM CDT   RETURN OB with CARMEN Santoyo CNM   [facility] ([facility])    [facility] [address]  [address]  [facility] [address] [phone]              Who to contact     Please call your clinic at [phone] to:    Ask questions about your health    Make or cancel appointments    Discuss your medicines    Learn about your test results    Speak to your doctor   If you have compliments or concerns about an experience at your clinic, or if you wish to file a complaint, please contact [facility] Patient Relations at [phone] or email us at [email or web address]         Additional Information About Your Visit        Care EveryWhere ID     This is your Care EveryWhere ID. This could be used by other organizations to access your [facility] medical records  [id number]        Your Vitals Were     Last Period                   01/22/2017            Blood Pressure from Last 3 Encounters:   09/06/17 99/65   08/04/17 102/65   07/28/17 106/68    Weight from Last 3 Encounters:   09/06/17 107 kg (236 lb)   08/04/17 100.7 kg (222 lb)   07/28/17 101.8 kg (224 lb 8 oz)              We Performed the Following     Growth Ultrasound [id number]        Primary Care Provider Office Phone # Fax #    Daniel Salazar [phone]       [facility] [address] [facility] [address]        Equal Access to Services     CARMELINA MONAE AH: Megan mayo  Antoinetteray, traceeda lupashaadaha, jeysonta kaflorecita nevarez, cordell covarrubias mignonhernando carlnatasha sriram. So [facility] [phone].    ATENCIÓN: Si calvin bernard, tiene a zurita disposición servicios gratuitos de asistencia lingüística. Marianne al [phone].    We comply with applicable federal civil rights laws and [place] laws. We do not discriminate on the basis of race, color, national origin, age, disability sex, sexual orientation or gender identity.            Thank you!     Thank you for choosing [facility]  for your care. Our goal is always to provide you with excellent care. Hearing back from our patients is one way we can continue to improve our services. Please take a few minutes to complete the written survey that you may receive in the mail after your visit with us. Thank you!             Your Updated Medication List - Protect others around you: Learn how to safely use, store and throw away your medicines at www.disposemymeds.org.          This list is accurate as of: 9/15/17  2:51 PM.  Always use your most recent med list.                   Brand Name Dispense Instructions for use Diagnosis    acetaminophen 325 MG tablet    TYLENOL    100 tablet    Take 2 tablets (650 mg) by mouth every 6 hours as needed for mild pain    High-risk pregnancy in second trimester       order for DME     1 each    Maternity Belt order    High-risk pregnancy in second trimester       SM PRENATAL VITAMINS 28-0.8 MG Tabs     60 tablet    Take 1 tablet by mouth daily    Pregnant state, incidental

## 2017-09-15 NOTE — PROGRESS NOTES
32 year old female, , presents at 34 4/7 weeks for obstetric ultrasound assessment indicated by size greater than dates, thrombocytopenia.    Single fetus    Presentation - cephalic    USEGA = 36 4/7 weeks.  EFW = 2,986 grams, 92 % for 34 weeks.      NICHO = 16.9cm.  FHR = 158bpm     Placenta anterior and grade 1    Comments: Growth 92%.    Findings discussed with patient.    Further studies as clinically indicated.    REBEKAH Arboleda MD MPH

## 2017-09-22 ENCOUNTER — OFFICE VISIT (OUTPATIENT)
Dept: OBGYN | Facility: CLINIC | Age: 32
End: 2017-09-22
Attending: ADVANCED PRACTICE MIDWIFE
Payer: COMMERCIAL

## 2017-09-22 ENCOUNTER — APPOINTMENT (OUTPATIENT)
Dept: LAB | Facility: CLINIC | Age: 32
End: 2017-09-22
Attending: ADVANCED PRACTICE MIDWIFE
Payer: COMMERCIAL

## 2017-09-22 VITALS
HEART RATE: 109 BPM | DIASTOLIC BLOOD PRESSURE: 66 MMHG | BODY MASS INDEX: 34.28 KG/M2 | WEIGHT: 226.2 LBS | HEIGHT: 68 IN | SYSTOLIC BLOOD PRESSURE: 97 MMHG

## 2017-09-22 DIAGNOSIS — O09.92 HIGH-RISK PREGNANCY IN SECOND TRIMESTER: Primary | ICD-10-CM

## 2017-09-22 DIAGNOSIS — D69.6 THROMBOCYTOPENIA (H): ICD-10-CM

## 2017-09-22 DIAGNOSIS — O22.00 VARICOSE VEINS DURING PREGNANCY, ANTEPARTUM: ICD-10-CM

## 2017-09-22 LAB
ERYTHROCYTE [DISTWIDTH] IN BLOOD BY AUTOMATED COUNT: 14.3 % (ref 10–15)
HCT VFR BLD AUTO: 35 % (ref 35–47)
HGB BLD-MCNC: 11.6 G/DL (ref 11.7–15.7)
MCH RBC QN AUTO: 31 PG (ref 26.5–33)
MCHC RBC AUTO-ENTMCNC: 33.1 G/DL (ref 31.5–36.5)
MCV RBC AUTO: 94 FL (ref 78–100)
PLATELET # BLD AUTO: 119 10E9/L (ref 150–450)
RBC # BLD AUTO: 3.74 10E12/L (ref 3.8–5.2)
WBC # BLD AUTO: 6.7 10E9/L (ref 4–11)

## 2017-09-22 PROCEDURE — 85027 COMPLETE CBC AUTOMATED: CPT | Performed by: ADVANCED PRACTICE MIDWIFE

## 2017-09-22 PROCEDURE — 36415 COLL VENOUS BLD VENIPUNCTURE: CPT | Performed by: ADVANCED PRACTICE MIDWIFE

## 2017-09-22 PROCEDURE — 99211 OFF/OP EST MAY X REQ PHY/QHP: CPT | Mod: ZF

## 2017-09-22 NOTE — LETTER
"2017       RE: Shruthi Gay  [address]    [place] [address]     Dear Colleague,    Thank you for referring your patient, Shruthi Gay, to the [facility] at [facility]. Please see a copy of my visit note below.    Subjective:      32 year old  at 35w4d presentst for a routine prenatal appointment.   No vaginal bleeding or leakage of fluid.  Rare contractions. Pos fetal movement.       No HA, visual changes, RUQ or epigastric pain.   Patient concerns:   Feeling well overall. Denies pain on her varicosities on Left LE.     Objective:  Vitals:    17 1012   BP: 97/66   Pulse: 109   Weight: 102.6 kg (226 lb 3.2 oz)   Height: 1.727 m (5' 8\")   , see ob flowsheet  Varicose vein on left LE. No redness, tenderness. trace edema.   Assessment/Plan     Encounter Diagnoses   Name Primary?     High-risk pregnancy in second trimester Yes     Thrombocytopenia (H)      Varicose veins during pregnancy, antepartum      Orders Placed This Encounter   Procedures     Compression Hose Maternity     CBC with platelets       ABO   Date Value Ref Range Status   2017 A  Final     RH(D)   Date Value Ref Range Status   2017 Pos  Final     Antibody Screen   Date Value Ref Range Status   2017 neg  Final   , Rhogam  was not given.    - Reviewed total weight gain, encouraged continued healthy diet and exercise.  .  Reviewed importance of daily fetal kick count and why/how to contact provider.    - Reviewed why/how to contact provider if headache/visual changes/RUQ or epigastric pain, decreased fetal movement, vaginal bleeding, leakage of fluid or more than 4 contractions in an hour.     Patient education/orders or handouts today:  PTL signs/symptoms  Reviewed GBS screening at 35-36 wks.    Return to clinic in 1 weeks and prn if questions or concerns.       Again, thank you for allowing me to participate in the care of your " patient.      Sincerely,    CARMEN Santoyo CNM

## 2017-09-22 NOTE — MR AVS SNAPSHOT
"              After Visit Summary   9/22/2017    Shruthi Bustos Victor Hugo    MRN: [id number]           Patient Information     Date Of Birth          1985        Visit Information        Provider Department      9/22/2017 10:00 AM Stefany Mixon APRN CNM [facility]        Today's Diagnoses     High-risk pregnancy in second trimester    -  1    Thrombocytopenia (H)        Varicose veins during pregnancy, antepartum           Follow-ups after your visit        Follow-up notes from your care team     Return in about 1 week (around 9/29/2017) for WEI DEE.      Who to contact     Please call your clinic at [phone] to:    Ask questions about your health    Make or cancel appointments    Discuss your medicines    Learn about your test results    Speak to your doctor   If you have compliments or concerns about an experience at your clinic, or if you wish to file a complaint, please contact [facility] Patient Relations at [phone] or email us at [email or web address]         Additional Information About Your Visit        Care EveryWhere ID     This is your Care EveryWhere ID. This could be used by other organizations to access your [facility] medical records  [id number]        Your Vitals Were     Pulse Height Last Period BMI (Body Mass Index)          109 1.727 m (5' 8\") 01/22/2017 34.39 kg/m2         Blood Pressure from Last 3 Encounters:   09/22/17 97/66   09/06/17 99/65   08/04/17 102/65    Weight from Last 3 Encounters:   09/22/17 102.6 kg (226 lb 3.2 oz)   09/06/17 107 kg (236 lb)   08/04/17 100.7 kg (222 lb)              We Performed the Following     CBC with platelets     Compression Hose Maternity        Primary Care Provider Office Phone # Fax #    Daniel Salazar [phone]       [facility] [address] [facility] [address]        Equal Access to Services     CARMELINA MONAE AH: iqra Rios " candi cortezflorecita anandacordell woods. So [facility] [phone].    ATENCIÓN: Si calvin bernard, tiene a zurita disposición servicios gratuitos de asistencia lingüística. Llame al [phone].    We comply with applicable federal civil rights laws and [place] laws. We do not discriminate on the basis of race, color, national origin, age, disability sex, sexual orientation or gender identity.            Thank you!     Thank you for choosing [facility]  for your care. Our goal is always to provide you with excellent care. Hearing back from our patients is one way we can continue to improve our services. Please take a few minutes to complete the written survey that you may receive in the mail after your visit with us. Thank you!             Your Updated Medication List - Protect others around you: Learn how to safely use, store and throw away your medicines at www.disposemymeds.org.          This list is accurate as of: 9/22/17 10:37 AM.  Always use your most recent med list.                   Brand Name Dispense Instructions for use Diagnosis    acetaminophen 325 MG tablet    TYLENOL    100 tablet    Take 2 tablets (650 mg) by mouth every 6 hours as needed for mild pain    High-risk pregnancy in second trimester       order for DME     1 each    Maternity Belt order    High-risk pregnancy in second trimester       SM PRENATAL VITAMINS 28-0.8 MG Tabs     60 tablet    Take 1 tablet by mouth daily    Pregnant state, incidental

## 2017-09-22 NOTE — PROGRESS NOTES
"Subjective:      32 year old  at 35w4d presentst for a routine prenatal appointment.   No vaginal bleeding or leakage of fluid.  Rare contractions. Pos fetal movement.       No HA, visual changes, RUQ or epigastric pain.   Patient concerns:   Feeling well overall. Denies pain on her varicosities on Left LE.     Objective:  Vitals:    17 1012   BP: 97/66   Pulse: 109   Weight: 102.6 kg (226 lb 3.2 oz)   Height: 1.727 m (5' 8\")   , see ob flowsheet  Varicose vein on left LE. No redness, tenderness. trace edema.   Assessment/Plan     Encounter Diagnoses   Name Primary?     High-risk pregnancy in second trimester Yes     Thrombocytopenia (H)      Varicose veins during pregnancy, antepartum      Orders Placed This Encounter   Procedures     Compression Hose Maternity     CBC with platelets       ABO   Date Value Ref Range Status   2017 A  Final     RH(D)   Date Value Ref Range Status   2017 Pos  Final     Antibody Screen   Date Value Ref Range Status   2017 neg  Final   , Rhogam  was not given.    - Reviewed total weight gain, encouraged continued healthy diet and exercise.  .  Reviewed importance of daily fetal kick count and why/how to contact provider.    - Reviewed why/how to contact provider if headache/visual changes/RUQ or epigastric pain, decreased fetal movement, vaginal bleeding, leakage of fluid or more than 4 contractions in an hour.     Patient education/orders or handouts today:  PTL signs/symptoms  Reviewed GBS screening at 35-36 wks.    Return to clinic in 1 weeks and prn if questions or concerns.     CARMEN Santoyo CNM    "

## 2017-09-29 ENCOUNTER — OFFICE VISIT (OUTPATIENT)
Dept: OBGYN | Facility: CLINIC | Age: 32
End: 2017-09-29
Attending: ADVANCED PRACTICE MIDWIFE
Payer: COMMERCIAL

## 2017-09-29 VITALS
SYSTOLIC BLOOD PRESSURE: 106 MMHG | WEIGHT: 227 LBS | BODY MASS INDEX: 34.4 KG/M2 | HEIGHT: 68 IN | DIASTOLIC BLOOD PRESSURE: 73 MMHG

## 2017-09-29 DIAGNOSIS — O09.92 HIGH-RISK PREGNANCY IN SECOND TRIMESTER: Primary | ICD-10-CM

## 2017-09-29 PROCEDURE — 99211 OFF/OP EST MAY X REQ PHY/QHP: CPT | Mod: ZF

## 2017-09-29 PROCEDURE — 87653 STREP B DNA AMP PROBE: CPT | Performed by: ADVANCED PRACTICE MIDWIFE

## 2017-09-29 ASSESSMENT — ANXIETY QUESTIONNAIRES
1. FEELING NERVOUS, ANXIOUS, OR ON EDGE: NOT AT ALL
3. WORRYING TOO MUCH ABOUT DIFFERENT THINGS: NOT AT ALL
6. BECOMING EASILY ANNOYED OR IRRITABLE: NOT AT ALL
2. NOT BEING ABLE TO STOP OR CONTROL WORRYING: NOT AT ALL
5. BEING SO RESTLESS THAT IT IS HARD TO SIT STILL: NOT AT ALL
IF YOU CHECKED OFF ANY PROBLEMS ON THIS QUESTIONNAIRE, HOW DIFFICULT HAVE THESE PROBLEMS MADE IT FOR YOU TO DO YOUR WORK, TAKE CARE OF THINGS AT HOME, OR GET ALONG WITH OTHER PEOPLE: NOT DIFFICULT AT ALL
GAD7 TOTAL SCORE: 0
7. FEELING AFRAID AS IF SOMETHING AWFUL MIGHT HAPPEN: NOT AT ALL

## 2017-09-29 ASSESSMENT — PATIENT HEALTH QUESTIONNAIRE - PHQ9
SUM OF ALL RESPONSES TO PHQ QUESTIONS 1-9: 0
5. POOR APPETITE OR OVEREATING: NOT AT ALL

## 2017-09-29 ASSESSMENT — PAIN SCALES - GENERAL: PAINLEVEL: NO PAIN (0)

## 2017-09-29 NOTE — MR AVS SNAPSHOT
After Visit Summary   9/29/2017    Shruthi Bustos Victor Hugo    MRN: [id number]           Patient Information     Date Of Birth          1985        Visit Information        Provider Department      9/29/2017 1:45 PM Stefany Mixon APRN CNM [facility]        Today's Diagnoses     High-risk pregnancy in second trimester    -  1       Follow-ups after your visit        Follow-up notes from your care team     Return in about 1 week (around 10/6/2017) for WEI DEE.      Your next 10 appointments already scheduled     Oct 06, 2017 11:15 AM CDT   RETURN OB with CARMNE Santoyo CNM   [facility] ([facility])    [facility] [address]  [address]  [address]  [facility] [id number] [phone]            Oct 13, 2017  1:00 PM CDT   RETURN OB with CARMEN Dodson CNM   [facility] ([facility])    [facility] [address]  [address]  [address]  [facility] [id number] [phone]            Oct 19, 2017  9:30 AM CDT   RETURN OB with CARMEN Narvaez CNM   [facility] ([facility])    [facility] [address]  [address]  [facility] [id number] [phone]            Oct 27, 2017 11:00 AM CDT   RETURN OB with CARMEN Santoyo CNM   [facility] ([facility])    [facility] [address]  [address]  [facility] [id number] [phone]              Who to contact     Please call your clinic at [phone] to:    Ask questions about your health    Make or cancel appointments    Discuss your medicines    Learn about your test results    Speak to your doctor   If you have compliments or concerns about an experience at your clinic, or if you wish to file a complaint, please contact [facility] Patient Relations at  "[phone] or email us at [email or web address]         Additional Information About Your Visit        Care EveryWhere ID     This is your Care EveryWhere ID. This could be used by other organizations to access your [facility] medical records  [id number]        Your Vitals Were     Height Last Period BMI (Body Mass Index)             1.727 m (5' 7.99\") 01/22/2017 34.52 kg/m2          Blood Pressure from Last 3 Encounters:   09/29/17 106/73   09/22/17 97/66   09/06/17 99/65    Weight from Last 3 Encounters:   09/29/17 103 kg (227 lb)   09/22/17 102.6 kg (226 lb 3.2 oz)   09/06/17 107 kg (236 lb)              We Performed the Following     Group B strep PCR        Primary Care Provider Office Phone # Fax #    Daniel Salazar [phone]       [facility] [address] [place] [address]        Equal Access to Services     ZOFIA MONAE AH: Hadii feliberto victor hadasho Soomaali, waaxda luqadaha, qaybta kaalmada adeegyada, cordell ambrocio . So [facility] [phone].    ATENCIÓN: Si habla español, tiene a zurita disposición servicios gratuitos de asistencia lingüística. Llame al [phone].    We comply with applicable federal civil rights laws and [place] laws. We do not discriminate on the basis of race, color, national origin, age, disability, sex, sexual orientation, or gender identity.            Thank you!     Thank you for choosing [facility]  for your care. Our goal is always to provide you with excellent care. Hearing back from our patients is one way we can continue to improve our services. Please take a few minutes to complete the written survey that you may receive in the mail after your visit with us. Thank you!             Your Updated Medication List - Protect others around you: Learn how to safely use, store and throw away your medicines at www.disposemymeds.org.          This list is accurate as of: 9/29/17  2:27 PM.  Always use your " most recent med list.                   Brand Name Dispense Instructions for use Diagnosis    acetaminophen 325 MG tablet    TYLENOL    100 tablet    Take 2 tablets (650 mg) by mouth every 6 hours as needed for mild pain    High-risk pregnancy in second trimester       order for DME     1 each    Maternity Belt order    High-risk pregnancy in second trimester       SM PRENATAL VITAMINS 28-0.8 MG Tabs     60 tablet    Take 1 tablet by mouth daily    Pregnant state, incidental

## 2017-09-29 NOTE — PROGRESS NOTES
"Subjective:      32 year old  at 36w4d presents for a routine prenatal appointment.  No vaginal bleeding,  leakage of fluid, or change in vaginal discharge.  A few rare contractions at work.  No fetal movement.     No HA, visual changes, RUQ or epigastric pain.   Patient concerns: Feeling well overall.   Objective:  Vitals:    17 1357   BP: 106/73   Weight: 103 kg (227 lb)   Height: 1.727 m (5' 7.99\")    See OB flowsheet    Assessment/Plan     Encounter Diagnosis   Name Primary?     High-risk pregnancy in second trimester Yes           PHQ-9 SCORE 10/6/2011 2017 2017   Total Score 0 - -   Total Score - 0 0     PHQ-9 SCORE 10/6/2011 2017 2017   Total Score 0 - -   Total Score - 0 0     GBS screening: Obtained.  Birth preferences reviewed: Un-Medicated  Labor signs discussed. Reinforced daily fetal movement counts.  Reviewed why/how to contact provider if headache/visual changes/RUQ or epigastric pain, decreased fetal movement, vaginal bleeding, leakage of fluid.  Declines flu sot  Size measuring more than dates. Had growth US 2 weeks ago, growth in 92%tile.  Consider repeat in 2 weeks.     Return to clinic in 1 week and prn if questions or concerns.     CARMEN Santoyo CNM  "

## 2017-09-29 NOTE — LETTER
"2017       RE: Shruthi Gay  [address]    [place] [address]     Dear Colleague,    Thank you for referring your patient, Shruthi Gay, to the [facility] at [facility]. Please see a copy of my visit note below.    Subjective:      32 year old  at 36w4d presents for a routine prenatal appointment.  No vaginal bleeding,  leakage of fluid, or change in vaginal discharge.  A few rare contractions at work.  No fetal movement.     No HA, visual changes, RUQ or epigastric pain.   Patient concerns: Feeling well overall.   Objective:  Vitals:    17 1357   BP: 106/73   Weight: 103 kg (227 lb)   Height: 1.727 m (5' 7.99\")    See OB flowsheet    Assessment/Plan     Encounter Diagnosis   Name Primary?     High-risk pregnancy in second trimester Yes           PHQ-9 SCORE 10/6/2011 2017 2017   Total Score 0 - -   Total Score - 0 0     PHQ-9 SCORE 10/6/2011 2017 2017   Total Score 0 - -   Total Score - 0 0     GBS screening: Obtained.  Birth preferences reviewed: Un-Medicated  Labor signs discussed. Reinforced daily fetal movement counts.  Reviewed why/how to contact provider if headache/visual changes/RUQ or epigastric pain, decreased fetal movement, vaginal bleeding, leakage of fluid.  Declines flu sot  Size measuring more than dates. Had growth US 2 weeks ago, growth in 92%tile.  Consider repeat in 2 weeks.     Return to clinic in 1 week and prn if questions or concerns.       Again, thank you for allowing me to participate in the care of your patient.      Sincerely,    CARMEN Santoyo CNM      "

## 2017-09-30 LAB
GP B STREP DNA SPEC QL NAA+PROBE: NEGATIVE
SPECIMEN SOURCE: NORMAL

## 2017-09-30 ASSESSMENT — ANXIETY QUESTIONNAIRES: GAD7 TOTAL SCORE: 0

## 2017-10-03 ENCOUNTER — HOSPITAL ENCOUNTER (OUTPATIENT)
Facility: CLINIC | Age: 32
Discharge: HOME | End: 2017-10-03
Attending: EMERGENCY MEDICINE | Admitting: MIDWIFE
Payer: COMMERCIAL

## 2017-10-03 VITALS
OXYGEN SATURATION: 97 % | TEMPERATURE: 98.3 F | HEART RATE: 94 BPM | RESPIRATION RATE: 18 BRPM | DIASTOLIC BLOOD PRESSURE: 64 MMHG | HEIGHT: 68 IN | SYSTOLIC BLOOD PRESSURE: 111 MMHG
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
## 2017-10-03 DIAGNOSIS — S20.219A CONTUSION OF CHEST WALL, UNSPECIFIED LATERALITY, INITIAL ENCOUNTER: ICD-10-CM

## 2017-10-03 DIAGNOSIS — S30.1XXA CONTUSION OF ABDOMINAL WALL, INITIAL ENCOUNTER: ICD-10-CM

## 2017-10-03 DIAGNOSIS — Z33.1 PREGNANCY, INCIDENTAL: ICD-10-CM

## 2017-10-03 DIAGNOSIS — R51.9 HEADACHE IN PREGNANCY, THIRD TRIMESTER: ICD-10-CM

## 2017-10-03 DIAGNOSIS — O26.893 HEADACHE IN PREGNANCY, THIRD TRIMESTER: ICD-10-CM

## 2017-10-03 DIAGNOSIS — Z3A.00 WEEKS OF GESTATION OF PREGNANCY NOT SPECIFIED: ICD-10-CM

## 2017-10-03 LAB
ALBUMIN SERPL-MCNC: 2.3 G/DL (ref 3.4–5)
ALP SERPL-CCNC: 211 U/L (ref 40–150)
ALT SERPL W P-5'-P-CCNC: 21 U/L (ref 0–50)
ANION GAP SERPL CALCULATED.3IONS-SCNC: 9 MMOL/L (ref 3–14)
APTT PPP: 29 SEC (ref 22–37)
AST SERPL W P-5'-P-CCNC: 21 U/L (ref 0–45)
BASOPHILS # BLD AUTO: 0 10E9/L (ref 0–0.2)
BASOPHILS NFR BLD AUTO: 0.1 %
BILIRUB DIRECT SERPL-MCNC: <0.1 MG/DL (ref 0–0.2)
BILIRUB SERPL-MCNC: 0.4 MG/DL (ref 0.2–1.3)
BUN SERPL-MCNC: 4 MG/DL (ref 7–30)
CALCIUM SERPL-MCNC: 8.7 MG/DL (ref 8.5–10.1)
CHLORIDE SERPL-SCNC: 108 MMOL/L (ref 94–109)
CO2 SERPL-SCNC: 21 MMOL/L (ref 20–32)
CREAT SERPL-MCNC: 0.54 MG/DL (ref 0.52–1.04)
DIFFERENTIAL METHOD BLD: ABNORMAL
EOSINOPHIL # BLD AUTO: 0.1 10E9/L (ref 0–0.7)
EOSINOPHIL NFR BLD AUTO: 0.8 %
ERYTHROCYTE [DISTWIDTH] IN BLOOD BY AUTOMATED COUNT: 15.3 % (ref 10–15)
FIBRINOGEN PPP-MCNC: 434 MG/DL (ref 200–420)
GFR SERPL CREATININE-BSD FRML MDRD: >90 ML/MIN/1.7M2
GLUCOSE SERPL-MCNC: 92 MG/DL (ref 70–99)
HCT VFR BLD AUTO: 34.7 % (ref 35–47)
HGB BLD-MCNC: 11.7 G/DL (ref 11.7–15.7)
HGB F BLD QL KLEIH BETKE: NORMAL
HGB F MFR BLD KLEIH BETKE: NORMAL %
IMM GRANULOCYTES # BLD: 0 10E9/L (ref 0–0.4)
IMM GRANULOCYTES NFR BLD: 0.5 %
INR PPP: 1 (ref 0.86–1.14)
LYMPHOCYTES # BLD AUTO: 1.8 10E9/L (ref 0.8–5.3)
LYMPHOCYTES NFR BLD AUTO: 22.9 %
MCH RBC QN AUTO: 30.9 PG (ref 26.5–33)
MCHC RBC AUTO-ENTMCNC: 33.7 G/DL (ref 31.5–36.5)
MCV RBC AUTO: 92 FL (ref 78–100)
MONOCYTES # BLD AUTO: 0.6 10E9/L (ref 0–1.3)
MONOCYTES NFR BLD AUTO: 7.9 %
NEUTROPHILS # BLD AUTO: 5.3 10E9/L (ref 1.6–8.3)
NEUTROPHILS NFR BLD AUTO: 67.8 %
NRBC # BLD AUTO: 0 10*3/UL
NRBC BLD AUTO-RTO: 0 /100
PLATELET # BLD AUTO: 122 10E9/L (ref 150–450)
POTASSIUM SERPL-SCNC: 3.6 MMOL/L (ref 3.4–5.3)
PROT SERPL-MCNC: 6.4 G/DL (ref 6.8–8.8)
RBC # BLD AUTO: 3.79 10E12/L (ref 3.8–5.2)
SODIUM SERPL-SCNC: 138 MMOL/L (ref 133–144)
WBC # BLD AUTO: 7.7 10E9/L (ref 4–11)

## 2017-10-03 PROCEDURE — 76604 US EXAM CHEST: CPT | Performed by: EMERGENCY MEDICINE

## 2017-10-03 PROCEDURE — 80076 HEPATIC FUNCTION PANEL: CPT | Performed by: EMERGENCY MEDICINE

## 2017-10-03 PROCEDURE — 85730 THROMBOPLASTIN TIME PARTIAL: CPT | Performed by: EMERGENCY MEDICINE

## 2017-10-03 PROCEDURE — 76801 OB US < 14 WKS SINGLE FETUS: CPT | Mod: 26 | Performed by: EMERGENCY MEDICINE

## 2017-10-03 PROCEDURE — 93005 ELECTROCARDIOGRAM TRACING: CPT | Performed by: EMERGENCY MEDICINE

## 2017-10-03 PROCEDURE — 99291 CRITICAL CARE FIRST HOUR: CPT | Mod: 25 | Performed by: EMERGENCY MEDICINE

## 2017-10-03 PROCEDURE — 85460 HEMOGLOBIN FETAL: CPT | Performed by: MIDWIFE

## 2017-10-03 PROCEDURE — 93010 ELECTROCARDIOGRAM REPORT: CPT | Mod: 59 | Performed by: EMERGENCY MEDICINE

## 2017-10-03 PROCEDURE — 99285 EMERGENCY DEPT VISIT HI MDM: CPT | Mod: 25 | Performed by: EMERGENCY MEDICINE

## 2017-10-03 PROCEDURE — 80048 BASIC METABOLIC PNL TOTAL CA: CPT | Performed by: EMERGENCY MEDICINE

## 2017-10-03 PROCEDURE — 85610 PROTHROMBIN TIME: CPT | Performed by: EMERGENCY MEDICINE

## 2017-10-03 PROCEDURE — 68200002 ZZH TRAUMA EVALUATION W/O CC LEVEL II: Performed by: EMERGENCY MEDICINE

## 2017-10-03 PROCEDURE — 59025 FETAL NON-STRESS TEST: CPT

## 2017-10-03 PROCEDURE — 85384 FIBRINOGEN ACTIVITY: CPT | Performed by: EMERGENCY MEDICINE

## 2017-10-03 PROCEDURE — 76801 OB US < 14 WKS SINGLE FETUS: CPT | Performed by: EMERGENCY MEDICINE

## 2017-10-03 PROCEDURE — 25000132 ZZH RX MED GY IP 250 OP 250 PS 637: Performed by: EMERGENCY MEDICINE

## 2017-10-03 PROCEDURE — 99214 OFFICE O/P EST MOD 30 MIN: CPT

## 2017-10-03 PROCEDURE — 85025 COMPLETE CBC W/AUTO DIFF WBC: CPT | Performed by: EMERGENCY MEDICINE

## 2017-10-03 PROCEDURE — 36415 COLL VENOUS BLD VENIPUNCTURE: CPT | Performed by: MIDWIFE

## 2017-10-03 PROCEDURE — 76604 US EXAM CHEST: CPT | Mod: 26 | Performed by: EMERGENCY MEDICINE

## 2017-10-03 RX ORDER — HYDROXYZINE HYDROCHLORIDE 50 MG/1
50-100 TABLET, FILM COATED ORAL EVERY 6 HOURS PRN
Status: DISCONTINUED | OUTPATIENT
Start: 2017-10-03 | End: 2017-10-03 | Stop reason: HOSPADM

## 2017-10-03 RX ORDER — ACETAMINOPHEN 500 MG
1000 TABLET ORAL ONCE
Status: COMPLETED | OUTPATIENT
Start: 2017-10-03 | End: 2017-10-03

## 2017-10-03 RX ORDER — ALUMINA, MAGNESIA, AND SIMETHICONE 2400; 2400; 240 MG/30ML; MG/30ML; MG/30ML
30 SUSPENSION ORAL
Status: DISCONTINUED | OUTPATIENT
Start: 2017-10-03 | End: 2017-10-03 | Stop reason: HOSPADM

## 2017-10-03 RX ORDER — ONDANSETRON 2 MG/ML
4 INJECTION INTRAMUSCULAR; INTRAVENOUS EVERY 6 HOURS PRN
Status: DISCONTINUED | OUTPATIENT
Start: 2017-10-03 | End: 2017-10-03 | Stop reason: HOSPADM

## 2017-10-03 RX ORDER — ACETAMINOPHEN 325 MG/1
650-975 TABLET ORAL EVERY 4 HOURS PRN
Status: DISCONTINUED | OUTPATIENT
Start: 2017-10-03 | End: 2017-10-03 | Stop reason: HOSPADM

## 2017-10-03 RX ADMIN — ACETAMINOPHEN 1000 MG: 500 TABLET, FILM COATED ORAL at 12:46

## 2017-10-03 ASSESSMENT — ENCOUNTER SYMPTOMS
ABDOMINAL PAIN: 1
SHORTNESS OF BREATH: 0
FEVER: 0
HEADACHES: 0
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
## 2017-10-03 NOTE — IP AVS SNAPSHOT
MRN:[id number]                      After Visit Summary   10/3/2017    Shruthi Bustos [facility]    MRN: [id number]           Thank you!     Thank you for choosing [facility] for your care. Our goal is always to provide you with excellent care. Hearing back from our patients is one way we can continue to improve our services. Please take a few minutes to complete the written survey that you may receive in the mail after you visit with us. Thank you!        Patient Information     Date Of Birth          1985        Designated Caregiver       Most Recent Value    Caregiver    Will someone help with your care after discharge? yes      About your hospital stay     You were admitted on:  October 3, 2017 You last received care in the:  [facility]    You were discharged on:  October 3, 2017       Who to Call     For medical emergencies, please call 911.  For non-urgent questions about your medical care, please call your primary care provider or clinic, [phone]          Attending Provider     Provider Specialty    Jennifer Stuart MD Emergency Medicine    Corazon Keen APRN [facility] Midwives    Flor Shultz APRN CN[facility] Midwives       Primary Care Provider Office Phone # Fax #    [facility] [phone]      Your next 10 appointments already scheduled     Oct 06, 2017 11:15 AM CDT   RETURN OB with CARMEN Santoyo CNM   [facility] ([facility])    [facility] [place] [facility] [address]  [facility] [address]   [phone]            Oct 13, 2017  1:00 PM CDT   RETURN OB with CARMEN Dodson CNM   [facility] ([facility])    [facility] [address]  [facility] [address]   [phone]            Oct 19, 2017  9:30 AM CDT   RETURN OB with CARMEN Narvaez CNM   [facility] ([facility])     [place] [facility] [address]  [address]  [facility] [address]   [phone]            Oct 27, 2017 11:00 AM CDT   RETURN OB with CARMEN Santoyo CNM   [facility] ([facility])    [place] [facility] [address]  [address]  [facility] [address]   [phone]              Further instructions from your care team       Discharge Instruction for Undelivered Patients      You were seen for: Labor Assessment and Fetal Assessment  We Consulted: Kim Keen  You had (Test or Medicine):NST  Diet:   Drink 8 to 12 glasses of liquids (milk, juice, water) every day.  You may eat meals and snacks.     Activity:  Count fetal kicks everyday (see handout)  Call your doctor or nurse midwife if your baby is moving less than usual.     Call your provider if you notice:  Swelling in your face or increased swelling in your hands or legs.  Headaches that are not relieved by Tylenol (acetaminophen).  Changes in your vision (blurring: seeing spots or stars.)  Nausea (sick to your stomach) and vomiting (throwing up).   Weight gain of 5 pounds or more per week.  Heartburn that doesn't go away.  Signs of bladder infection: pain when you urinate (use the toilet), need to go more often and more urgently.  The bag of messina (rupture of membranes) breaks, or you notice leaking in your underwear.  Bright red blood in your underwear.  Abdominal (lower belly) or stomach pain.  For first baby: Contractions (tightening) less than 5 minutes apart for one hour or more.  Second (plus) baby: Contractions (tightening) less than 10 minutes apart and getting stronger.  *If less than 34 weeks: Contractions (tightenings) more than 6 times in one hour.  Increase or change in vaginal discharge (note the color and amount)  Other:     Follow-up:  As scheduled in the clinic          Pending Results     Date and Time Order Name Status Description    10/3/2017 1200  "EKG 12 lead Preliminary             Statement of Approval     Ordered          10/03/17 6435  I have reviewed and agree with all the recommendations and orders detailed in this document.  EFFECTIVE NOW     Approved and electronically signed by:  Corazon Keen APRN CNM             Admission Information     Date & Time Provider Department Dept. Phone    10/3/2017 Corazon Keen APRN CNM [facility] [phone]      Your Vitals Were     Blood Pressure Pulse Temperature Respirations Height Last Period    111/64 94 98.3  F (36.8  C) (Oral) 18 1.727 m (5' 8\") 01/22/2017    Pulse Oximetry                   97%           Care EveryWhere ID     This is your Care EveryWhere ID. This could be used by other organizations to access your [facility] medical records  [id number]        Equal Access to Services     CARMELINA MONAE : Megan Mccoy, waaxcourtney luqadaha, qaybta kaalmada adearnoldyacourtney, cordell bhatia. So [facility] [phone].    ATENCIÓN: Si habla español, tiene a zurita disposición servicios gratuitos de asistencia lingüística. Llame al [phone].    We comply with applicable federal civil rights laws and [place] laws. We do not discriminate on the basis of race, color, national origin, age, disability, sex, sexual orientation, or gender identity.               Review of your medicines      UNREVIEWED medicines. Ask your doctor about these medicines        Dose / Directions    acetaminophen 325 MG tablet   Commonly known as:  TYLENOL   Used for:  High-risk pregnancy in second trimester        Dose:  650 mg   Take 2 tablets (650 mg) by mouth every 6 hours as needed for mild pain   Quantity:  100 tablet   Refills:  0       SM PRENATAL VITAMINS 28-0.8 MG Tabs   Used for:  Pregnant state, incidental        Dose:  1 tablet   Take 1 tablet by mouth daily   Quantity:  60 tablet   Refills:  3         CONTINUE these medicines which have NOT CHANGED        Dose / Directions    order for DME   Used " for:  High-risk pregnancy in second trimester        Maternity Belt order   Quantity:  1 each   Refills:  0                Protect others around you: Learn how to safely use, store and throw away your medicines at www.disposemymeds.org.             Medication List: This is a list of all your medications and when to take them. Check marks below indicate your daily home schedule. Keep this list as a reference.      Medications           Morning Afternoon Evening Bedtime As Needed    acetaminophen 325 MG tablet   Commonly known as:  TYLENOL   Take 2 tablets (650 mg) by mouth every 6 hours as needed for mild pain   Last time this was given:  1,000 mg on 10/3/2017 12:46 PM                                order for DME   Maternity Belt order                                SM PRENATAL VITAMINS 28-0.8 MG Tabs   Take 1 tablet by mouth daily

## 2017-10-03 NOTE — IP AVS SNAPSHOT
[facility]    [address] [place] [address]    Phone:  [phone]                                       After Visit Summary   10/3/2017    Shruthi Bustos [facility]    MRN: [id number]           After Visit Summary Signature Page     I have received my discharge instructions, and my questions have been answered. I have discussed any challenges I see with this plan with the nurse or doctor.    ..........................................................................................................................................  Patient/Patient Representative Signature      ..........................................................................................................................................  Patient Representative Print Name and Relationship to Patient    ..................................................               ................................................  Date                                            Time    ..........................................................................................................................................  Reviewed by Signature/Title    ...................................................              ..............................................  Date                                                            Time

## 2017-10-03 NOTE — DISCHARGE INSTRUCTIONS
Discharge Instruction for Undelivered Patients      You were seen for: Labor Assessment and Fetal Assessment  We Consulted: Kim Keen  You had (Test or Medicine):NST  Diet:   Drink 8 to 12 glasses of liquids (milk, juice, water) every day.  You may eat meals and snacks.     Activity:  Count fetal kicks everyday (see handout)  Call your doctor or nurse midwife if your baby is moving less than usual.     Call your provider if you notice:  Swelling in your face or increased swelling in your hands or legs.  Headaches that are not relieved by Tylenol (acetaminophen).  Changes in your vision (blurring: seeing spots or stars.)  Nausea (sick to your stomach) and vomiting (throwing up).   Weight gain of 5 pounds or more per week.  Heartburn that doesn't go away.  Signs of bladder infection: pain when you urinate (use the toilet), need to go more often and more urgently.  The bag of messina (rupture of membranes) breaks, or you notice leaking in your underwear.  Bright red blood in your underwear.  Abdominal (lower belly) or stomach pain.  For first baby: Contractions (tightening) less than 5 minutes apart for one hour or more.  Second (plus) baby: Contractions (tightening) less than 10 minutes apart and getting stronger.  *If less than 34 weeks: Contractions (tightenings) more than 6 times in one hour.  Increase or change in vaginal discharge (note the color and amount)  Other:     Follow-up:  As scheduled in the clinic
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
## 2017-10-04 LAB — INTERPRETATION ECG - MUSE: NORMAL

## 2017-10-06 ENCOUNTER — OFFICE VISIT (OUTPATIENT)
Dept: OBGYN | Facility: CLINIC | Age: 32
End: 2017-10-06
Attending: ADVANCED PRACTICE MIDWIFE
Payer: COMMERCIAL

## 2017-10-06 VITALS
WEIGHT: 225.8 LBS | HEART RATE: 98 BPM | BODY MASS INDEX: 34.22 KG/M2 | DIASTOLIC BLOOD PRESSURE: 74 MMHG | HEIGHT: 68 IN | SYSTOLIC BLOOD PRESSURE: 108 MMHG

## 2017-10-06 DIAGNOSIS — O09.92 HIGH-RISK PREGNANCY IN SECOND TRIMESTER: Primary | ICD-10-CM

## 2017-10-06 DIAGNOSIS — G47.00 PERSISTENT DISORDER OF INITIATING OR MAINTAINING SLEEP: ICD-10-CM

## 2017-10-06 PROCEDURE — 99211 OFF/OP EST MAY X REQ PHY/QHP: CPT | Mod: ZF
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
## 2017-10-06 NOTE — MR AVS SNAPSHOT
After Visit Summary   10/6/2017    Shruthi Bustos Victor Hugo    MRN: [id number]           Patient Information     Date Of Birth          1985        Visit Information        Provider Department      10/6/2017 11:15 AM Stefany Mixon APRN CNM [facility]        Today's Diagnoses     High-risk pregnancy in second trimester    -  1    Persistent disorder of initiating or maintaining sleep        Labor and delivery indication for care or intervention           Follow-ups after your visit        Follow-up notes from your care team     Return in about 1 week (around 10/13/2017) for WEI DEE.      Your next 10 appointments already scheduled     Oct 13, 2017  1:00 PM CDT   RETURN OB with CARMEN Dodson CNM   [facility] ([facility])    [facility] [address]  [facility] [address] [phone]            Oct 19, 2017  9:30 AM CDT   RETURN OB with CARMEN Narvaez CNM   [facility] ([facility])    [facility] [address]  [address]  [facility] [id number] [phone]            Oct 27, 2017 11:00 AM CDT   RETURN OB with CARMEN Santoyo CNM   [facility] ([facility])    [facility] [address]  [address]  [facility] [id number] [phone]              Who to contact     Please call your clinic at [phone] to:    Ask questions about your health    Make or cancel appointments    Discuss your medicines    Learn about your test results    Speak to your doctor   If you have compliments or concerns about an experience at your clinic, or if you wish to file a complaint, please contact [facility] Patient Relations at [phone] or email us at [email or web address]         Additional Information About Your Visit        Care  "EveryWhere ID     This is your Care EveryWhere ID. This could be used by other organizations to access your [facility] medical records  [id number]        Your Vitals Were     Pulse Height Last Period BMI (Body Mass Index)          98 1.727 m (5' 7.99\") 01/22/2017 34.34 kg/m2         Blood Pressure from Last 3 Encounters:   10/06/17 108/74   10/03/17 111/64   09/29/17 106/73    Weight from Last 3 Encounters:   10/06/17 102.4 kg (225 lb 12.8 oz)   09/29/17 103 kg (227 lb)   09/22/17 102.6 kg (226 lb 3.2 oz)              Today, you had the following     No orders found for display         Today's Medication Changes          These changes are accurate as of: 10/6/17 12:11 PM.  If you have any questions, ask your nurse or doctor.               Start taking these medicines.        Dose/Directions    doxylamine 25 MG Tabs tablet   Commonly known as:  UNISOM   Used for:  Persistent disorder of initiating or maintaining sleep, High-risk pregnancy in second trimester   Started by:  Stefany Mixon APRN CNM        Dose:  25 mg   Take 1 tablet (25 mg) by mouth At Bedtime   Quantity:  14 each   Refills:  0            Where to get your medicines      These medications were sent to [facility] [address] [place] [address], [address]) [place] [address]     Phone:  [phone]     doxylamine 25 MG Tabs tablet                Primary Care Provider Office Phone # Fax #    [facility] [phone]       [address]  [facility] [address]        Equal Access to Services     [facility]: Hadii feliberto mayo Soray, waaxda luqadaha, qaybta kaalmada adearnoldyacourtney, cordell bhatia. So [facility] [phone].    ATENCIÓN: Si habla español, tiene a zurita disposición servicios gratuitos de asistencia lingüística. Llame al [phone].    We comply with applicable federal civil rights laws and [place] laws. We " do not discriminate on the basis of race, color, national origin, age, disability, sex, sexual orientation, or gender identity.            Thank you!     Thank you for choosing [facility]  for your care. Our goal is always to provide you with excellent care. Hearing back from our patients is one way we can continue to improve our services. Please take a few minutes to complete the written survey that you may receive in the mail after your visit with us. Thank you!             Your Updated Medication List - Protect others around you: Learn how to safely use, store and throw away your medicines at www.disposemymeds.org.          This list is accurate as of: 10/6/17 12:11 PM.  Always use your most recent med list.                   Brand Name Dispense Instructions for use Diagnosis    acetaminophen 325 MG tablet    TYLENOL    100 tablet    Take 2 tablets (650 mg) by mouth every 6 hours as needed for mild pain    High-risk pregnancy in second trimester       doxylamine 25 MG Tabs tablet    UNISOM    14 each    Take 1 tablet (25 mg) by mouth At Bedtime    Persistent disorder of initiating or maintaining sleep, High-risk pregnancy in second trimester       order for DME     1 each    Maternity Belt order    High-risk pregnancy in second trimester       SM PRENATAL VITAMINS 28-0.8 MG Tabs     60 tablet    Take 1 tablet by mouth daily    Pregnant state, incidental
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
## 2017-10-13 ENCOUNTER — OFFICE VISIT (OUTPATIENT)
Dept: OBGYN | Facility: CLINIC | Age: 32
End: 2017-10-13
Attending: ADVANCED PRACTICE MIDWIFE
Payer: COMMERCIAL

## 2017-10-13 VITALS
HEART RATE: 88 BPM | WEIGHT: 226.3 LBS | SYSTOLIC BLOOD PRESSURE: 102 MMHG | BODY MASS INDEX: 34.3 KG/M2 | HEIGHT: 68 IN | DIASTOLIC BLOOD PRESSURE: 71 MMHG

## 2017-10-13 DIAGNOSIS — O09.93 HIGH-RISK PREGNANCY IN THIRD TRIMESTER: Primary | ICD-10-CM

## 2017-10-13 DIAGNOSIS — D69.6 THROMBOCYTOPENIA (H): ICD-10-CM

## 2017-10-13 PROCEDURE — 99211 OFF/OP EST MAY X REQ PHY/QHP: CPT | Mod: ZF

## 2017-10-13 NOTE — LETTER
"10/13/2017       RE: Shruthi Gay  [address]  [place] [address]     Dear Colleague,    Thank you for referring your patient, Shruthi Gay, to the [facility] at [facility]. Please see a copy of my visit note below.    Subjective:     32 year old  at 38w4d presents for routine prenatal visit.   Denies vaginal bleeding or leakage of fluid.  Reports mild contractions q 30 minutes.  + fetal movement.       No HA, visual changes, RUQ or epigastric pain.   Patient concerns: Feeling well overall.    Objective:  Vitals:    10/13/17 1305   BP: 102/71   Pulse: 88   Weight: 102.6 kg (226 lb 4.8 oz)   Height: 1.727 m (5' 7.99\")    See OB flowsheet    Assessment/Plan  Encounter Diagnosis   Name Primary?     High-risk pregnancy in third trimester Yes     - Discussed repeating CBC today to evaluate platelets. Pt declines to go to lab today.  - Reviewed why/how to contact provider if headache/visual changes/RUQ or epigastric pain, decreased fetal movement, vaginal bleeding, leakage of fluid or strong/regular contractions.  - Patient education/orders or handouts today: Sign/symptoms of labor and When to call for labor or other concerns  - Return to clinic in 1 week and prn if questions or concerns.     Again, thank you for allowing me to participate in the care of your patient.      Sincerely,    CARMEN Ivey CNM      "

## 2017-10-13 NOTE — PROGRESS NOTES
"Subjective:     32 year old  at 38w4d presents for routine prenatal visit.   Denies vaginal bleeding or leakage of fluid.  Reports mild contractions q 30 minutes.  + fetal movement.       No HA, visual changes, RUQ or epigastric pain.   Patient concerns: Feeling well overall.    Objective:  Vitals:    10/13/17 1305   BP: 102/71   Pulse: 88   Weight: 102.6 kg (226 lb 4.8 oz)   Height: 1.727 m (5' 7.99\")    See OB flowsheet    Assessment/Plan  Encounter Diagnosis   Name Primary?     High-risk pregnancy in third trimester Yes     - Discussed repeating CBC today to evaluate platelets. Pt declines to go to lab today.  - Reviewed why/how to contact provider if headache/visual changes/RUQ or epigastric pain, decreased fetal movement, vaginal bleeding, leakage of fluid or strong/regular contractions.  - Patient education/orders or handouts today: Sign/symptoms of labor and When to call for labor or other concerns  - Return to clinic in 1 week and prn if questions or concerns.   "

## 2017-10-13 NOTE — MR AVS SNAPSHOT
"              After Visit Summary   10/13/2017    Shruthi Bustos Victor Hugo    MRN: [id number]           Patient Information     Date Of Birth          1985        Visit Information        Provider Department      10/13/2017 1:00 PM Holland Logan APRN [facility]        Today's Diagnoses     High-risk pregnancy in third trimester    -  1    Thrombocytopenia (H)           Follow-ups after your visit        Follow-up notes from your care team     Return in about 1 week (around 10/20/2017).      Your next 10 appointments already scheduled     Oct 19, 2017  9:30 AM CDT   RETURN OB with CARMEN Narvaez CNM   [facility] ([facility])    [facility] [address]  [address]  [facility] [address] [phone]            Oct 27, 2017 11:00 AM CDT   RETURN OB with CARMEN Santoyo   [facility] ([facility])    [facility] [address]  [address]  [facility] [address] [phone]              Who to contact     Please call your clinic at [phone] to:    Ask questions about your health    Make or cancel appointments    Discuss your medicines    Learn about your test results    Speak to your doctor   If you have compliments or concerns about an experience at your clinic, or if you wish to file a complaint, please contact [facility] Patient Relations at [phone] or email us at [email or web address]         Additional Information About Your Visit        Care EveryWhere ID     This is your Care EveryWhere ID. This could be used by other organizations to access your [facility] medical records  [id number]        Your Vitals Were     Pulse Height Last Period BMI (Body Mass Index)          88 1.727 m (5' 7.99\") 01/22/2017 34.42 kg/m2         Blood Pressure from Last 3 Encounters:   10/13/17 102/71   10/06/17 108/74 "   10/03/17 111/64    Weight from Last 3 Encounters:   10/13/17 102.6 kg (226 lb 4.8 oz)   10/06/17 102.4 kg (225 lb 12.8 oz)   09/29/17 103 kg (227 lb)              Today, you had the following     No orders found for display       Primary Care Provider Office Phone # Fax #    [facility] [phone]       [address]  [facility] [address]        Equal Access to Services     CARMELINA MONAE : Hadii aad ku hadasho Soomaali, waaxda luqadaha, qaybta kaalmada adeegyada, waxay idiin hayaan adearnold ambrocio . So [facility] [phone].    ATENCIÓN: Si habla español, tiene a zurita disposición servicios gratuitos de asistencia lingüística. Marianne al [phone].    We comply with applicable federal civil rights laws and [place] laws. We do not discriminate on the basis of race, color, national origin, age, disability, sex, sexual orientation, or gender identity.            Thank you!     Thank you for choosing [facility]  for your care. Our goal is always to provide you with excellent care. Hearing back from our patients is one way we can continue to improve our services. Please take a few minutes to complete the written survey that you may receive in the mail after your visit with us. Thank you!             Your Updated Medication List - Protect others around you: Learn how to safely use, store and throw away your medicines at www.disposemymeds.org.          This list is accurate as of: 10/13/17  1:22 PM.  Always use your most recent med list.                   Brand Name Dispense Instructions for use Diagnosis    acetaminophen 325 MG tablet    TYLENOL    100 tablet    Take 2 tablets (650 mg) by mouth every 6 hours as needed for mild pain    High-risk pregnancy in second trimester       doxylamine 25 MG Tabs tablet    UNISOM    14 each    Take 1 tablet (25 mg) by mouth At Bedtime    Persistent disorder of initiating or maintaining sleep, High-risk pregnancy in  second trimester       order for DME     1 each    Maternity Belt order    High-risk pregnancy in second trimester       SM PRENATAL VITAMINS 28-0.8 MG Tabs     60 tablet    Take 1 tablet by mouth daily    Pregnant state, incidental

## 2017-10-19 ENCOUNTER — OFFICE VISIT (OUTPATIENT)
Dept: OBGYN | Facility: CLINIC | Age: 32
End: 2017-10-19
Attending: ADVANCED PRACTICE MIDWIFE
Payer: COMMERCIAL

## 2017-10-19 VITALS
HEIGHT: 68 IN | SYSTOLIC BLOOD PRESSURE: 107 MMHG | DIASTOLIC BLOOD PRESSURE: 68 MMHG | BODY MASS INDEX: 34.25 KG/M2 | WEIGHT: 226 LBS

## 2017-10-19 DIAGNOSIS — O09.93 HIGH-RISK PREGNANCY IN THIRD TRIMESTER: Primary | ICD-10-CM

## 2017-10-19 DIAGNOSIS — D69.6 THROMBOCYTOPENIA (H): ICD-10-CM

## 2017-10-19 PROCEDURE — 99211 OFF/OP EST MAY X REQ PHY/QHP: CPT | Mod: ZF

## 2017-10-19 ASSESSMENT — PAIN SCALES - GENERAL: PAINLEVEL: NO PAIN (0)

## 2017-10-19 NOTE — MR AVS SNAPSHOT
"              After Visit Summary   10/19/2017    Shruthi Bustos Victor Hugo    MRN: [id number]           Patient Information     Date Of Birth          1985        Visit Information        Provider Department      10/19/2017 9:30 AM Corazon Keen APRN CNM [facility]        Today's Diagnoses     High-risk pregnancy in third trimester    -  1    Thrombocytopenia (H)           Follow-ups after your visit        Follow-up notes from your care team     Return in about 1 week (around 10/26/2017) for VINAYAK.      Your next 10 appointments already scheduled     Oct 27, 2017 11:00 AM CDT   RETURN OB with CARMEN Santoyo CNM   [facility] ([facility])    [facility] [address]  [address]  [facility] [address] [phone]              Who to contact     Please call your clinic at [phone] to:    Ask questions about your health    Make or cancel appointments    Discuss your medicines    Learn about your test results    Speak to your doctor   If you have compliments or concerns about an experience at your clinic, or if you wish to file a complaint, please contact [facility] Patient Relations at [phone] or email us at [email or web address]         Additional Information About Your Visit        Care EveryWhere ID     This is your Care EveryWhere ID. This could be used by other organizations to access your [place] medical records  [id number]        Your Vitals Were     Height Last Period BMI (Body Mass Index)             1.727 m (5' 7.99\") 01/22/2017 34.37 kg/m2          Blood Pressure from Last 3 Encounters:   10/19/17 107/68   10/13/17 102/71   10/06/17 108/74    Weight from Last 3 Encounters:   10/19/17 102.5 kg (226 lb)   10/13/17 102.6 kg (226 lb 4.8 oz)   10/06/17 102.4 kg (225 lb 12.8 oz)              Today, you had the following     No orders found for display       Primary Care " Provider Office Phone # Fax #    [facility] [phone]       [address]  [facility] [address]        Equal Access to Services     CARMELINA MONAE : Hadii feliberto victor haydero Soomaali, waaxda luqadaha, qaybta kaalmada adeegyada, cordell cuadra laVincentbrett bhatia. So [facility] [phone].    ATENCIÓN: Si habla español, tiene a zurita disposición servicios gratuitos de asistencia lingüística. Llame al [phone].    We comply with applicable federal civil rights laws and [place] laws. We do not discriminate on the basis of race, color, national origin, age, disability, sex, sexual orientation, or gender identity.            Thank you!     Thank you for choosing [facility]  for your care. Our goal is always to provide you with excellent care. Hearing back from our patients is one way we can continue to improve our services. Please take a few minutes to complete the written survey that you may receive in the mail after your visit with us. Thank you!             Your Updated Medication List - Protect others around you: Learn how to safely use, store and throw away your medicines at www.disposemymeds.org.          This list is accurate as of: 10/19/17 10:18 AM.  Always use your most recent med list.                   Brand Name Dispense Instructions for use Diagnosis    acetaminophen 325 MG tablet    TYLENOL    100 tablet    Take 2 tablets (650 mg) by mouth every 6 hours as needed for mild pain    High-risk pregnancy in second trimester       doxylamine 25 MG Tabs tablet    UNISOM    14 each    Take 1 tablet (25 mg) by mouth At Bedtime    Persistent disorder of initiating or maintaining sleep, High-risk pregnancy in second trimester       order for DME     1 each    Maternity Belt order    High-risk pregnancy in second trimester       SM PRENATAL VITAMINS 28-0.8 MG Tabs     60 tablet    Take 1 tablet by mouth daily    Pregnant state, incidental

## 2017-10-19 NOTE — PROGRESS NOTES
"Subjective:     32 year old  at 39w3d presents for routine prenatal visit.            no vaginal bleeding or leakage of fluid.  occ  contractions.  Good  fetal movement.        No HA, visual changes, RUQ or epigastric pain.   Patient concerns: hopes to deliver soon.  Baby's usually prior to 40 w   Feeling well overall.  Objective:  Vitals:    10/19/17 0948   BP: 107/68   Weight: 102.5 kg (226 lb)   Height: 1.727 m (5' 7.99\")    See OB flowsheet  Assessment/Plan   supervision of High risk pregnancy  , hx prior c/s  2 VBACs plans TOLAC   - Reviewed postdates testing including BPP => 41 weeks and rationale for induction of labor based on results.   Patient declines induction due to advanced dilation  Hx prior c/s offered option of AROM at birthplace.  Requested membranes stripping.  Go to hospital if leaking or regular contractions asap.  - Reviewed why/how to contact provider if headache/visual changes/RUQ or epigastric pain, decreased fetal movement, vaginal bleeding, leakage of fluid or strong/regular contractions.   Patient education/orders or handouts today:  Sign/symptoms of labor and When to call for labor or other concerns  Return to clinic in 1 week and prn if questions or concerns.   CARMEN Narvaez CNM      "

## 2017-10-19 NOTE — LETTER
"10/19/2017       RE: Shruthi Gay  [address]  [place] [address]     Dear Colleague,    Thank you for referring your patient, Shruthi Gay, to the [facility] at [facility]. Please see a copy of my visit note below.    Subjective:     32 year old  at 39w3d presents for routine prenatal visit.            no vaginal bleeding or leakage of fluid.  occ  contractions.  Good  fetal movement.        No HA, visual changes, RUQ or epigastric pain.   Patient concerns: hopes to deliver soon.  Baby's usually prior to 40 w   Feeling well overall.  Objective:  Vitals:    10/19/17 0948   BP: 107/68   Weight: 102.5 kg (226 lb)   Height: 1.727 m (5' 7.99\")    See OB flowsheet  Assessment/Plan   supervision of High risk pregnancy  , hx prior c/s  2 VBACs plans TOLAC   - Reviewed postdates testing including BPP => 41 weeks and rationale for induction of labor based on results.   Patient declines induction due to advanced dilation  Hx prior c/s offered option of AROM at birthplace.  Requested membranes stripping.  Go to hospital if leaking or regular contractions asap.  - Reviewed why/how to contact provider if headache/visual changes/RUQ or epigastric pain, decreased fetal movement, vaginal bleeding, leakage of fluid or strong/regular contractions.   Patient education/orders or handouts today:  Sign/symptoms of labor and When to call for labor or other concerns  Return to clinic in 1 week and prn if questions or concerns.     Again, thank you for allowing me to participate in the care of your patient.      Sincerely,    CARMEN Narvaez CNM      "

## 2017-10-24 ENCOUNTER — HOSPITAL ENCOUNTER (INPATIENT)
Facility: CLINIC | Age: 32
LOS: 2 days | Discharge: HOME | End: 2017-10-26
Attending: ADVANCED PRACTICE MIDWIFE | Admitting: ADVANCED PRACTICE MIDWIFE
Payer: COMMERCIAL

## 2017-10-24 DIAGNOSIS — O34.219 VBAC (VAGINAL BIRTH AFTER CESAREAN): Primary | ICD-10-CM

## 2017-10-24 PROBLEM — Z36.89 ENCOUNTER FOR TRIAGE IN PREGNANT PATIENT: Status: ACTIVE | Noted: 2017-10-24

## 2017-10-24 LAB
ABO + RH BLD: NORMAL
ABO + RH BLD: NORMAL
BASOPHILS # BLD AUTO: 0 10E9/L (ref 0–0.2)
BASOPHILS NFR BLD AUTO: 0.1 %
BLD GP AB SCN SERPL QL: NORMAL
BLOOD BANK CMNT PATIENT-IMP: NORMAL
DIFFERENTIAL METHOD BLD: ABNORMAL
EOSINOPHIL # BLD AUTO: 0.1 10E9/L (ref 0–0.7)
EOSINOPHIL NFR BLD AUTO: 0.6 %
ERYTHROCYTE [DISTWIDTH] IN BLOOD BY AUTOMATED COUNT: 15.3 % (ref 10–15)
HCT VFR BLD AUTO: 39.3 % (ref 35–47)
HGB BLD-MCNC: 13.2 G/DL (ref 11.7–15.7)
IMM GRANULOCYTES # BLD: 0 10E9/L (ref 0–0.4)
IMM GRANULOCYTES NFR BLD: 0.4 %
LYMPHOCYTES # BLD AUTO: 2.2 10E9/L (ref 0.8–5.3)
LYMPHOCYTES NFR BLD AUTO: 24.2 %
MCH RBC QN AUTO: 31.4 PG (ref 26.5–33)
MCHC RBC AUTO-ENTMCNC: 33.6 G/DL (ref 31.5–36.5)
MCV RBC AUTO: 93 FL (ref 78–100)
MONOCYTES # BLD AUTO: 0.6 10E9/L (ref 0–1.3)
MONOCYTES NFR BLD AUTO: 6.4 %
NEUTROPHILS # BLD AUTO: 6.1 10E9/L (ref 1.6–8.3)
NEUTROPHILS NFR BLD AUTO: 68.3 %
NRBC # BLD AUTO: 0 10*3/UL
NRBC BLD AUTO-RTO: 0 /100
PLATELET # BLD AUTO: 145 10E9/L (ref 150–450)
RBC # BLD AUTO: 4.21 10E12/L (ref 3.8–5.2)
SPECIMEN EXP DATE BLD: NORMAL
T PALLIDUM IGG+IGM SER QL: NEGATIVE
WBC # BLD AUTO: 8.9 10E9/L (ref 4–11)

## 2017-10-24 PROCEDURE — 10907ZC DRAINAGE OF AMNIOTIC FLUID, THERAPEUTIC FROM PRODUCTS OF CONCEPTION, VIA NATURAL OR ARTIFICIAL OPENING: ICD-10-PCS | Performed by: ADVANCED PRACTICE MIDWIFE

## 2017-10-24 PROCEDURE — 25000125 ZZHC RX 250

## 2017-10-24 PROCEDURE — 86900 BLOOD TYPING SEROLOGIC ABO: CPT | Performed by: ADVANCED PRACTICE MIDWIFE

## 2017-10-24 PROCEDURE — 25000132 ZZH RX MED GY IP 250 OP 250 PS 637: Performed by: ADVANCED PRACTICE MIDWIFE

## 2017-10-24 PROCEDURE — 72200001 ZZH LABOR CARE VAGINAL DELIVERY SINGLE

## 2017-10-24 PROCEDURE — 36415 COLL VENOUS BLD VENIPUNCTURE: CPT | Performed by: ADVANCED PRACTICE MIDWIFE

## 2017-10-24 PROCEDURE — 85025 COMPLETE CBC W/AUTO DIFF WBC: CPT | Performed by: ADVANCED PRACTICE MIDWIFE

## 2017-10-24 PROCEDURE — 86901 BLOOD TYPING SEROLOGIC RH(D): CPT | Performed by: ADVANCED PRACTICE MIDWIFE

## 2017-10-24 PROCEDURE — 86780 TREPONEMA PALLIDUM: CPT | Performed by: ADVANCED PRACTICE MIDWIFE

## 2017-10-24 PROCEDURE — 86850 RBC ANTIBODY SCREEN: CPT | Performed by: ADVANCED PRACTICE MIDWIFE

## 2017-10-24 PROCEDURE — S0191 MISOPROSTOL, ORAL, 200 MCG: HCPCS | Performed by: ADVANCED PRACTICE MIDWIFE

## 2017-10-24 PROCEDURE — 12000030 ZZH R&B OB INTERMEDIATE UMMC

## 2017-10-24 RX ORDER — OXYTOCIN/0.9 % SODIUM CHLORIDE 30/500 ML
100-340 PLASTIC BAG, INJECTION (ML) INTRAVENOUS CONTINUOUS PRN
Status: COMPLETED | OUTPATIENT
Start: 2017-10-24 | End: 2017-10-24

## 2017-10-24 RX ORDER — OXYTOCIN 10 [USP'U]/ML
10 INJECTION, SOLUTION INTRAMUSCULAR; INTRAVENOUS
Status: DISCONTINUED | OUTPATIENT
Start: 2017-10-24 | End: 2017-10-26 | Stop reason: HOSPADM

## 2017-10-24 RX ORDER — MISOPROSTOL 200 UG/1
400 TABLET ORAL
Status: COMPLETED | OUTPATIENT
Start: 2017-10-24 | End: 2017-10-24

## 2017-10-24 RX ORDER — NALOXONE HYDROCHLORIDE 0.4 MG/ML
.1-.4 INJECTION, SOLUTION INTRAMUSCULAR; INTRAVENOUS; SUBCUTANEOUS
Status: DISCONTINUED | OUTPATIENT
Start: 2017-10-24 | End: 2017-10-24

## 2017-10-24 RX ORDER — LIDOCAINE HYDROCHLORIDE 10 MG/ML
INJECTION, SOLUTION EPIDURAL; INFILTRATION; INTRACAUDAL; PERINEURAL
Status: DISCONTINUED
Start: 2017-10-24 | End: 2017-10-24 | Stop reason: WASHOUT

## 2017-10-24 RX ORDER — OXYTOCIN/0.9 % SODIUM CHLORIDE 30/500 ML
PLASTIC BAG, INJECTION (ML) INTRAVENOUS
Status: COMPLETED
Start: 2017-10-24 | End: 2017-10-24

## 2017-10-24 RX ORDER — LANOLIN 100 %
OINTMENT (GRAM) TOPICAL
Status: DISCONTINUED | OUTPATIENT
Start: 2017-10-24 | End: 2017-10-26 | Stop reason: HOSPADM

## 2017-10-24 RX ORDER — IBUPROFEN 400 MG/1
400-800 TABLET, FILM COATED ORAL EVERY 6 HOURS PRN
Status: DISCONTINUED | OUTPATIENT
Start: 2017-10-24 | End: 2017-10-26 | Stop reason: HOSPADM

## 2017-10-24 RX ORDER — MISOPROSTOL 200 UG/1
TABLET ORAL
Status: DISCONTINUED
Start: 2017-10-24 | End: 2017-10-24 | Stop reason: WASHOUT

## 2017-10-24 RX ORDER — IBUPROFEN 800 MG/1
800 TABLET, FILM COATED ORAL
Status: COMPLETED | OUTPATIENT
Start: 2017-10-24 | End: 2017-10-24

## 2017-10-24 RX ORDER — DOCUSATE SODIUM 100 MG/1
100 CAPSULE, LIQUID FILLED ORAL 2 TIMES DAILY PRN
Status: DISCONTINUED | OUTPATIENT
Start: 2017-10-24 | End: 2017-10-26 | Stop reason: HOSPADM

## 2017-10-24 RX ORDER — ONDANSETRON 2 MG/ML
4 INJECTION INTRAMUSCULAR; INTRAVENOUS EVERY 6 HOURS PRN
Status: DISCONTINUED | OUTPATIENT
Start: 2017-10-24 | End: 2017-10-24

## 2017-10-24 RX ORDER — METHYLERGONOVINE MALEATE 0.2 MG/ML
200 INJECTION INTRAVENOUS
Status: DISCONTINUED | OUTPATIENT
Start: 2017-10-24 | End: 2017-10-24

## 2017-10-24 RX ORDER — BISACODYL 10 MG
10 SUPPOSITORY, RECTAL RECTAL DAILY PRN
Status: DISCONTINUED | OUTPATIENT
Start: 2017-10-26 | End: 2017-10-26 | Stop reason: HOSPADM

## 2017-10-24 RX ORDER — ACETAMINOPHEN 325 MG/1
650 TABLET ORAL EVERY 4 HOURS PRN
Status: DISCONTINUED | OUTPATIENT
Start: 2017-10-24 | End: 2017-10-24

## 2017-10-24 RX ORDER — OXYCODONE AND ACETAMINOPHEN 5; 325 MG/1; MG/1
1 TABLET ORAL
Status: DISCONTINUED | OUTPATIENT
Start: 2017-10-24 | End: 2017-10-24

## 2017-10-24 RX ORDER — CARBOPROST TROMETHAMINE 250 UG/ML
250 INJECTION, SOLUTION INTRAMUSCULAR
Status: DISCONTINUED | OUTPATIENT
Start: 2017-10-24 | End: 2017-10-24

## 2017-10-24 RX ORDER — OXYTOCIN/0.9 % SODIUM CHLORIDE 30/500 ML
340 PLASTIC BAG, INJECTION (ML) INTRAVENOUS CONTINUOUS PRN
Status: DISCONTINUED | OUTPATIENT
Start: 2017-10-24 | End: 2017-10-26 | Stop reason: HOSPADM

## 2017-10-24 RX ORDER — NALOXONE HYDROCHLORIDE 0.4 MG/ML
.1-.4 INJECTION, SOLUTION INTRAMUSCULAR; INTRAVENOUS; SUBCUTANEOUS
Status: DISCONTINUED | OUTPATIENT
Start: 2017-10-24 | End: 2017-10-26 | Stop reason: HOSPADM

## 2017-10-24 RX ORDER — OXYTOCIN/0.9 % SODIUM CHLORIDE 30/500 ML
100 PLASTIC BAG, INJECTION (ML) INTRAVENOUS CONTINUOUS
Status: DISCONTINUED | OUTPATIENT
Start: 2017-10-24 | End: 2017-10-26 | Stop reason: HOSPADM

## 2017-10-24 RX ORDER — OXYTOCIN 10 [USP'U]/ML
10 INJECTION, SOLUTION INTRAMUSCULAR; INTRAVENOUS
Status: DISCONTINUED | OUTPATIENT
Start: 2017-10-24 | End: 2017-10-24

## 2017-10-24 RX ORDER — HYDROCORTISONE 2.5 %
CREAM (GRAM) TOPICAL 3 TIMES DAILY PRN
Status: DISCONTINUED | OUTPATIENT
Start: 2017-10-24 | End: 2017-10-26 | Stop reason: HOSPADM

## 2017-10-24 RX ORDER — FENTANYL CITRATE 50 UG/ML
50-100 INJECTION, SOLUTION INTRAMUSCULAR; INTRAVENOUS
Status: DISCONTINUED | OUTPATIENT
Start: 2017-10-24 | End: 2017-10-24

## 2017-10-24 RX ORDER — ACETAMINOPHEN 325 MG/1
650 TABLET ORAL EVERY 4 HOURS PRN
Status: DISCONTINUED | OUTPATIENT
Start: 2017-10-24 | End: 2017-10-26 | Stop reason: HOSPADM

## 2017-10-24 RX ORDER — OXYCODONE HYDROCHLORIDE 5 MG/1
5-10 TABLET ORAL
Status: DISCONTINUED | OUTPATIENT
Start: 2017-10-24 | End: 2017-10-26 | Stop reason: HOSPADM

## 2017-10-24 RX ADMIN — IBUPROFEN 800 MG: 400 TABLET ORAL at 18:10

## 2017-10-24 RX ADMIN — IBUPROFEN 800 MG: 800 TABLET ORAL at 06:25

## 2017-10-24 RX ADMIN — Medication 340 ML/HR: at 06:00

## 2017-10-24 RX ADMIN — MISOPROSTOL 400 MCG: 200 TABLET ORAL at 07:03

## 2017-10-24 RX ADMIN — IBUPROFEN 800 MG: 400 TABLET ORAL at 12:21

## 2017-10-24 RX ADMIN — ACETAMINOPHEN 650 MG: 325 TABLET, FILM COATED ORAL at 09:31

## 2017-10-24 RX ADMIN — OXYTOCIN-SODIUM CHLORIDE 0.9% IV SOLN 30 UNIT/500ML 340 ML/HR: 30-0.9/5 SOLUTION at 06:00

## 2017-10-24 RX ADMIN — ACETAMINOPHEN 650 MG: 325 TABLET, FILM COATED ORAL at 20:58

## 2017-10-24 RX ADMIN — ACETAMINOPHEN 650 MG: 325 TABLET, FILM COATED ORAL at 14:42

## 2017-10-24 NOTE — PLAN OF CARE
Patient arrived to [facility] unit via wheelchair at 0915,with belongings, accompanied by , with infant in arms. Received report from MIAH Nichols and checked bands. Unit and room orientation completd. Call light given; no concerns present at this time. Continue with plan of care.

## 2017-10-24 NOTE — PROVIDER NOTIFICATION
10/24/17 0700   Provider Notification   Provider Name/Title ROX Ahuja CNM   Method of Notification In Department   Request Evaluate-Remote   Notification Reason Other   During third fundal check, patient had large clot and more bleeding. Pad weighed and found to be 197mL. Per ROX Ahuja, give 400mcg oral miso. Will continue to monitor closely.

## 2017-10-24 NOTE — IP AVS SNAPSHOT
[facility]    [address] [facility] [address]    Phone:  [phone]                                       After Visit Summary   10/24/2017    Shruthi Bustos [place]    MRN: [id number]           After Visit Summary Signature Page     I have received my discharge instructions, and my questions have been answered. I have discussed any challenges I see with this plan with the nurse or doctor.    ..........................................................................................................................................  Patient/Patient Representative Signature      ..........................................................................................................................................  Patient Representative Print Name and Relationship to Patient    ..................................................               ................................................  Date                                            Time    ..........................................................................................................................................  Reviewed by Signature/Title    ...................................................              ..............................................  Date                                                            Time

## 2017-10-24 NOTE — PLAN OF CARE
Problem: Patient Care Overview  Goal: Plan of Care/Patient Progress Review  Outcome: Improving  Patient's postpartum assessments WDL, vital signs stable. Fundus firm and midline, lochia WDL. Voiding without difficulty. Using heat packs and taking ibuprofen and tylenol for pain control. Breastfeeds infant on demand, minimal staff assistance to achieve deeper latch. Bonding well with infant.

## 2017-10-24 NOTE — L&D DELIVERY NOTE
DELIVERY NOTE:  Contractions began on 10/23/17 becoming stronger and more regular at 2300. Arrived to birthplace at approximately 0400. Cervix 9cm/100/-1 with BBOW. Labored upright and then felt more pressure at 450. Consented to amniotomy at that time with clear fluid. Moved to bed and cervix complete/100/-1 at 0502. Pushed in semi-fowlers and on left side.  () of liveborn male infant at 0556, OA. Nuchal cord x2 easily reduced. Infant born to bed and placed on mom's abdomen, vigorous and crying with stimulation. Cord clamped at about 1 minute of age and RN asked to further assess  on the warmer. Pitocin IV running after delivery of baby. Spontaneous schultze delivery of intact placenta with 3vc at 0602. Fundus firm with massage. Shallow perineal laceration, hemostatic, no repair needed. Apgars 7 and 8. Infant weight 9# 0oz. Mom and baby stable in delivery room.   CARMEN Cartwright CNM      Delivery Summary    IUP at 40 weeks gestation delivered on 2017.     delivery of a viable Male infant.  Weight : 9 pounds 0 ounces   Apgars of 7 at 1 minute and 8 at 5 minutes.  Labor was spontaneous.  Medications administered  in labor:  Pain Rx None; Antibiotics No  Perineum: Minor laceration - No repair  Placenta-mechanism: spontaneous, intact,  with a 3 vessel cord.  Quantitative Blood Loss was 497  Complications of labor and delivery: None  Anticipated Discharge Date: 10/26/17  Birth attendants:   CARMEN Cartwright CNM     Delivery Summary    Shruthi Bustos [facility] MRN# [id number]   Age: 32 year old YOB: 1985     Labor Event Times:    Labor Onset Date       Labor Onset Time    Dilation Complete Date    Dilation Complete Time       Start Pushing Date        Start Pushing Time            Labor Length:    1st Stage (hrs/min) 6.00 2.00   2nd Stage (hrs/min) 0.00 54.00   3rd Stage (hrs/min) 0.00 6.00       Labor Events:     Labor No   Rupture Date      Rupture Time     Rupture Type Artificial Rupture of Membranes   Fluid Color     Labor Type     Induction    Induction Indication         Augmentation    Labor Complications     Additional Complications     Management of Labor        Antibiotics     IV Antibiotic Given     Additional Management     Fetal Status Prior to  Delivery     Fetal Status Comments         Cervical Ripening:    Date     Time     Type         Delivery:    Episiotomy None   Local Anesthetic        Lacerations 1st   Sponge Count Correct       Needle Count Correct     Final Count by:    Sutures     Blood loss (ml)    Packing Intentionally Left In     Number     Comments           Information for the patient's :  Ade Gay [[id number]]       Delivery  10/24/2017 5:56 AM by  , Spontaneous  Sex:  male Gestational Age: 40w1d  Delivery Clinician:     Living?:            APGARS  One minute Five minutes Ten minutes   Skin color:            Heart rate:            Grimace:            Muscle tone:            Breathing:            Totals: 7  8         Presentation/position:           Resuscitation and Interventions: Method:  Suctioning  Oxygen  Oximetry  Oxygen Type:     Intubation Time:   # of Attempts:     ETT Size:        Tracheal Suction:  2  Tracheal returns:  Other   [place] Care at Delivery:   of viable baby boy. Infant placed skin to skin immediately after delivery. Dried and stimulated. Delayed cord clamp 1 minute. Infant still not vigorous and brought to warmer. Retractions noted and breath sounds course. Pulse Ox applied and PPV p  rovided. Infant suctioned x2 with thick secretions present. By 6 minutes of age, respiratory effort increased, retractions decreased and Pulse ox 90%. PPV removed and infant remains vigorous. Weights and measurements performed and infant returned ski  n to skin with mom at 10 minutes          Cord information:     Disposition of cord blood:      Blood gases sent?    Complications:     Placenta:  "Delivered:           appearance.  Comments: héctor Alvarenga .  Disposition: Hospital disposal   Measurements:  Weight: 8 lb 15.9 oz (4080 g)  Height: 21\"  Head circumference: 35.6 cm  Chest circumference:     Temperature:     Other providers:       Additional  information:  Observed anomalies     Output in Delivery Room: Stool                            "

## 2017-10-24 NOTE — IP AVS SNAPSHOT
MRN:[id number]                      After Visit Summary   10/24/2017    Shruthi Bustos [facility]    MRN: [id number]           Thank you!     Thank you for choosing [facility] for your care. Our goal is always to provide you with excellent care. Hearing back from our patients is one way we can continue to improve our services. Please take a few minutes to complete the written survey that you may receive in the mail after you visit with us. Thank you!        Patient Information     Date Of Birth          1985        Designated Caregiver       Most Recent Value    Caregiver    Will someone help with your care after discharge? no      About your hospital stay     You were admitted on:  October 24, 2017 You last received care in the:  [facility]    You were discharged on:  October 26, 2017        Reason for your hospital stay       Maternity care                  Who to Call     For medical emergencies, please call 911.  For non-urgent questions about your medical care, please call your primary care provider or clinic, [phone]          Attending Provider     Provider Specialty    Natasha Ahuja APRN CNM OB/Gyn    [place], Soraya Bentley CNM Midwives       Primary Care Provider Office Phone # Fax #    [facility] [phone]      After Care Instructions     Activity       Review discharge instructions            Diet       Resume previous diet            Discharge Instructions - Postpartum visit       Schedule postpartum visit with your provider and return to clinic in 6 weeks.                  Further instructions from your care team       Vaginal Delivery Discharge Instructions: Emanuel Burgessqabadka:     Waydiiso qoyska iyo saaxibadaa boby ku bear jameson u dirktajasson.    Gavino meng ilaa uu dhakhtarkaagu ansixiyo.    Si fudud u qaado dhowrka asbuuc e xiga si aad u ogalaato in jirMercy Healthelizabeth carmen coreyo. Zhen prieto  kartaa howl kasta oo aad rabto ilaa meeshaas laga gaarayo.    Gaadhi levy wadin markaad qaadanayso  kaniiniyada xanuunka ee dhkhatarku kuu qoray . waxaad gaadhi wadi kartaa haddii aad qaadanayso kaniiniyada xanuunka ee koontarka.    [facility] bixiyahaaga daryeelka caafimaaad haddii aad qabtid mid ka mid  calaamadahan carmen socda:    Haddii suufka dhiigga nuuga uu ku buuxsamo 1 saac gudihiis, ama aad aragto xinjiro dhiig  oo ka wayn kubadda golafka.     Dhiig soconaya wax kabadan 6 asbuuc.    Haddii aad qabto dheecaan farjiga ka imaanaya oo  si xun u uraya.     [facility] 100.4  F (38  C) am aka sii saraysa (heerkulka laga qaaday carabka hoostiiisa), oo qarqaryo leh ama aan lahayn     Xanuun, nabar, majiirid daran oo aad ka dareeto qaybta hoose ee ubucda.    Xanuun sii kordya, barar, guduudasho ama dheecaan ka dhiimaya meesha la tolay ee qaliinka.    Kaadi badan oo dagdag  oo markasta ku qabanaysa , ama gubasho aad dareento markaad kaajayso.    Guduudasho, barar, ama xanuun aad ka dareento xididada lugta.    Dhibaato kaa haysata naas nuujinta, ama guduudasho ama xanuun naaska ah.    Xanuun sii kordhaya ama aan ka dhamaanayn meesha la tolay ama danqashada dilaaca.    Lalabbo ama matag.    Xabad xanuun iyo qufac ama naqaska oo kugu dhagaya.    Dhibaato ay la socoto twila earl aa walwal.     Haddii aad qabtid wax angélica ah oo ku saabsan inaad waxyeelayso naftaada ama ilmaha, wac dhakhtarka rhonda markiiba.     Haddii aad qabto zurita aalo ama angélica solizaawalt guriga ku noqoto kadib.    Gacmahaagga nadiifi:  Markasta dhaqa gacahaaga ka hor inta aadan taaban farjiga agagaarkiisa  iyo meesha la tolay. John waxay caawiniaysaa inuu yaraado infakshanku. Hdii gacmahaagu emilee stallworthticmaali karjohanaa aalkalo aad gacmaha ku tirtirto si aad u nadiifiso gacmahaaga. Cidiyahaaga nadiifi ooo jar.      Vaginal Delivery Discharge Instructions  Activity:     Ask family and friends for help when you need it.    Do not place  anything in your vagina until your doctor approves.    Take it easy for the next few weeks to allow your body to recover. You may do any activities you feel up to at that point.    Do not drive while taking pain pills prescribed by your doctor. You may drive if taking over-the-counter pain pills.    Call your health care provider if you have any of these symptoms:    You soak a sanitary pad with blood within 1 hour, or you see blood clots larger than a golf ball.    Bleeding that lasts more than 6 weeks.    You have vaginal discharge that smells bad.     A fever of 100.4  F (38  C) or higher (temperature taken under your tongue), with or without chills     Severe, pain, cramping or tenderness in your lower belly area.    Increased pain, swelling, redness or fluid around your stitches.    A more frequent or urgent need to urinate (pee), or it burns when you pee.    Redness, swelling or pain around a vein in your leg.    Problems breastfeeding, or a red or painful area on your breast.    Pain that increases or does not go away from an episiotomy or perineal tear.    Nausea and vomiting.    Chest pain and cough or are gasping for air.    Problems coping with sadness, anxiety, or depression.     If you have any concerns about hurting yourself or the baby, call your doctor right away.      You have questions or concerns after you return home.    Keep your hands clean:  Always wash your hands before touching your perineal area and stitches.  This helps reduce your risk of infection.  If your hands aren t dirty, you may use an alcohol hand-rub to clean your hands. Keep your nails clean and short.        Pending Results     No orders found from 10/22/2017 to 10/25/2017.            Statement of Approval     Ordered          10/26/17 1012  I have reviewed and agree with all the recommendations and orders detailed in this document.  EFFECTIVE NOW     Approved and electronically signed by:  Holland Logan APRN CNM              Admission Information     Date & Time Provider Department Dept. Phone    10/24/2017 Soraya Whitehead [facility] [phone]      Your Vitals Were     Blood Pressure Pulse Temperature Respirations Last Period       117/76 73 98.1  F (36.7  C) (Oral) 14 01/22/2017       Care EveryWhere ID     This is your Care EveryWhere ID. This could be used by other organizations to access your [place] medical records  [id number]        Equal Access to Services     CARMELINA MONAE : Hadii aad ku hadasho Soomaali, waaxda luqadaha, qaybta kaalmada adeegyada, waxay justinin hayaan adearnold bhatia. So [facility] [phone].    ATENCIÓN: Si habla español, tiene a zurita disposición servicios gratuitos de asistencia lingüística. Llame al [phone].    We comply with applicable federal civil rights laws and [place] laws. We do not discriminate on the basis of race, color, national origin, age, disability, sex, sexual orientation, or gender identity.               Review of your medicines      START taking        Dose / Directions    docusate sodium 100 MG capsule   Commonly known as:  COLACE        Dose:  100 mg   Take 1 capsule (100 mg) by mouth 2 times daily as needed for constipation   Quantity:  60 capsule   Refills:  1       ferrous sulfate 325 (65 FE) MG tablet   Commonly known as:  IRON        Dose:  325 mg   Take 1 tablet (325 mg) by mouth daily (with breakfast)   Quantity:  60 tablet   Refills:  0       ibuprofen 400 MG tablet   Commonly known as:  ADVIL/MOTRIN        Dose:  400-800 mg   Take 1-2 tablets (400-800 mg) by mouth every 6 hours as needed for other (cramping)   Quantity:  60 tablet   Refills:  1         CONTINUE these medicines which may have CHANGED, or have new prescriptions. If we are uncertain of the size of tablets/capsules you have at home, strength may be listed as something that might have changed.        Dose / Directions    * acetaminophen 325 MG tablet   Commonly known as:  TYLENOL    This may have changed:  Another medication with the same name was added. Make sure you understand how and when to take each.   Used for:  High-risk pregnancy in second trimester        Dose:  650 mg   Take 2 tablets (650 mg) by mouth every 6 hours as needed for mild pain   Quantity:  100 tablet   Refills:  0       * acetaminophen 325 MG tablet   Commonly known as:  TYLENOL   This may have changed:  You were already taking a medication with the same name, and this prescription was added. Make sure you understand how and when to take each.        Dose:  650 mg   Take 2 tablets (650 mg) by mouth every 4 hours as needed for mild pain or fever   Quantity:  60 tablet   Refills:  1       * Notice:  This list has 2 medication(s) that are the same as other medications prescribed for you. Read the directions carefully, and ask your doctor or other care provider to review them with you.      CONTINUE these medicines which have NOT CHANGED        Dose / Directions    doxylamine 25 MG Tabs tablet   Commonly known as:  UNISOM   Used for:  Persistent disorder of initiating or maintaining sleep, High-risk pregnancy in second trimester        Dose:  25 mg   Take 1 tablet (25 mg) by mouth At Bedtime   Quantity:  14 each   Refills:  0       order for DME   Used for:  High-risk pregnancy in second trimester        Maternity Belt order   Quantity:  1 each   Refills:  0       SM PRENATAL VITAMINS 28-0.8 MG Tabs   Used for:  Pregnant state, incidental        Dose:  1 tablet   Take 1 tablet by mouth daily   Quantity:  60 tablet   Refills:  3            Where to get your medicines      These medications were sent to [facility] [place], [place] - [address]  [address] [address]     Phone:  [phone]     acetaminophen 325 MG tablet    docusate sodium 100 MG capsule    ferrous sulfate 325 (65 FE) MG tablet    ibuprofen 400 MG tablet                Protect others around you: Learn how to  safely use, store and throw away your medicines at www.disposemymeds.org.             Medication List: This is a list of all your medications and when to take them. Check marks below indicate your daily home schedule. Keep this list as a reference.      Medications           Morning Afternoon Evening Bedtime As Needed    * acetaminophen 325 MG tablet   Commonly known as:  TYLENOL   Take 2 tablets (650 mg) by mouth every 6 hours as needed for mild pain   Last time this was given:  650 mg on 10/26/2017 12:48 AM                                * acetaminophen 325 MG tablet   Commonly known as:  TYLENOL   Take 2 tablets (650 mg) by mouth every 4 hours as needed for mild pain or fever   Last time this was given:  650 mg on 10/26/2017 12:48 AM                                docusate sodium 100 MG capsule   Commonly known as:  COLACE   Take 1 capsule (100 mg) by mouth 2 times daily as needed for constipation   Last time this was given:  100 mg on 10/25/2017  8:37 PM                                doxylamine 25 MG Tabs tablet   Commonly known as:  UNISOM   Take 1 tablet (25 mg) by mouth At Bedtime                                ferrous sulfate 325 (65 FE) MG tablet   Commonly known as:  IRON   Take 1 tablet (325 mg) by mouth daily (with breakfast)                                ibuprofen 400 MG tablet   Commonly known as:  ADVIL/MOTRIN   Take 1-2 tablets (400-800 mg) by mouth every 6 hours as needed for other (cramping)   Last time this was given:  800 mg on 10/26/2017  9:06 AM                                order for DME   Maternity Belt order                                SM PRENATAL VITAMINS 28-0.8 MG Tabs   Take 1 tablet by mouth daily                                * Notice:  This list has 2 medication(s) that are the same as other medications prescribed for you. Read the directions carefully, and ask your doctor or other care provider to review them with you.

## 2017-10-24 NOTE — PLAN OF CARE
Problem: Patient Care Overview  Goal: Plan of Care/Patient Progress Review  Outcome: No Change  Data: [facility]   Reason for maternal/fetal assessment per patient is Rule Out Labor  .  Patient is a . Prenatal record reviewed.      Obstetric History       T3      L3     SAB0   TAB0   Ectopic0   Multiple0   Live Births3        # Outcome Date GA Lbr Ezekiel/2nd Weight Sex Delivery Anes PTL Lv   7 Current                     6 Term 12 39w4d 13:37 / 00:36 3.374 kg (7 lb 7 oz) F  None N CHENCHO      Name: ALBANIA REBOLLEDO      Apgar1:  9                Apgar5: 9   5 Term 02/25/10 38w0d   3.175 kg (7 lb) F    N CHENCHO   4 Term 09 39w0d     M CS-Unspec   N CHENCHO   3 SAB                     2 SAB                     1 SAB                         Obstetric Comments   Patient had bleeding with abdominal cramping starting about 45 minutes ago, shortly before midnight.     . Medical history:   Past Medical History:   Diagnosis Date     NO ACTIVE PROBLEMS     . Gestational Age 40w1d. VSS. Fetal movement present. Patient complains of contractions since yesterday morning, worsening at 11pm last night. Patient complains of cramping, backache and pelvic pressure.. Patient denies vaginal discharge,  UTI symptoms, GI problems, bloody show, vaginal bleeding, edema, headache, visual disturbances, epigastric/URQ pain, or rupture of membranes. Support persons brother present.  Action: Verbal consent for EFM. Triage assessment completed. EFM applied: , accelerations present no decels. Uterine assessment : contractions Q 2-10 mins. Fetal assessment: Presumed adequate fetal oxygenation documented (see flow record).   Response:  Natasha Ahuja CNM informed of patient arrival. Plan per provider is admit to labor as cervix 8.5/100/-1. Patient verbalized agreement with plan. Patient transferred to room 444 ambulatory, oriented to room and call light.

## 2017-10-24 NOTE — PROGRESS NOTES
Transferred to [facility] via wheelchair.  Mother held her  in her arms.  H[place]ian  present.  Pt reported feeling more comfortable with cramping after large void.  Up to assist to bathroom.  Total weight 511 before subtraction of large basin, ice pack/pad and chux.  QBL post partum 141.  Reported total qbl to MIAH Crandall during bedside report.  Pt c/o constipation w last BM 10/23.  Reported to LEILA Logan.  Stool softner is ordered.  Pt offered assistance with breast feeding but declined until after she ate breakfast.  Pt now in [facility] and is willing to breast feed baby.  Bedside report and id bands checked/matched with MIAH Crandall.  Call light is within mother's reach.

## 2017-10-24 NOTE — PLAN OF CARE
Problem: Patient Care Overview  Goal: Plan of Care/Patient Progress Review  Outcome: No Change  Vaginal Delivery Note   of viable Male with Natasha Ahuja CNM in attendance.  Vida Fraire Nursery RN  present.  Infant without spontaneous cry, to mother's abdomen, dried and stimulated- brought to warmer for further stimulation-see delivery summary.  APGAR at 1 minute:  7 and APGAR at 5 minutes: 8.  Placenta delivered with out complication, no laceration, no repair, cori cares provided.  Mother and baby in stable condition.

## 2017-10-24 NOTE — H&P
[facility] Labor and Delivery History and Physical    Shruthi Gay MRN# [id number]   Age: 32 year old YOB: 1985     Date of Admission:  10/24/2017    Primary care provider: ROSALBA DEE          Chief Complaint:   Shruthi Gay is a 32 year old female who is 40w1d pregnant and being admitted for active labor. Contractions began yesterday, becoming stronger and closer together at 1100 pm. Denies LOF, bleeding, HA or vision changes. Reports good FM. Shruthi was accompanied to birthplace by her brother.           Pregnancy history:   Prenatal records reviewed  Transfer of care/Initiated care with JITENDRA Springer at 27 weeks x 8 visits  Onset of care 3/3/17 x 4 visits with [facility] (12 total visits)   Prepreg weight 216# now 226# TWG 10#    OBSTETRIC HISTORY:    Obstetric History       T3      L3     SAB0   TAB0   Ectopic0   Multiple0   Live Births3       # Outcome Date GA Lbr Ezekiel/2nd Weight Sex Delivery Anes PTL Lv   7 Current            6 Term 12 39w4d 13:37 / 00:36 3.374 kg (7 lb 7 oz) F  None N CHENCHO      Name: ALBANIA REBOLLEDO      Apgar1:  9                Apgar5: 9   5 Term 02/25/10 38w0d  3.175 kg (7 lb) F   N CHENCHO   4 Term 09 39w0d   M CS-Unspec  N CHENCHO   3 SAB            2 SAB            1 SAB               Obstetric Comments          EDC: Estimated Date of Delivery: 10/23/17 based on 9+4 week U/S     Prenatal Labs:   Lab Results   Component Value Date    ABO A 2017    RH Pos 2017    AS neg 2017    HEPBANG non reactive 2017    CHPCRT negataive 2017    GCPCRT negative 2017    TREPAB Negative 2017    RUBELLAABIGG 156 2011    HGB 11.7 10/03/2017    HIV Negative 2011       GBS Status:   Lab Results   Component Value Date    GBS Negative 2017       Active Problem List  Patient Active Problem List   Diagnosis     Obesity     TB lung, latent     High-risk pregnancy in third trimester      Previous  delivery, antepartum condition or complication     Thrombocytopenia (H)     Encounter for triage in pregnant patient     Labor and delivery, indication for care       Medication Prior to Admission  Prescriptions Prior to Admission   Medication Sig Dispense Refill Last Dose     Prenatal Vit-Fe Fumarate-FA (SM PRENATAL VITAMINS) 28-0.8 MG TABS Take 1 tablet by mouth daily 60 tablet 3 10/23/2017 at Unknown time     acetaminophen (TYLENOL) 325 MG tablet Take 2 tablets (650 mg) by mouth every 6 hours as needed for mild pain 100 tablet 0 Past Month at Unknown time     doxylamine (UNISOM) 25 MG TABS tablet Take 1 tablet (25 mg) by mouth At Bedtime 14 each 0 More than a month at Unknown time     order for DME Maternity Belt order 1 each 0 Taking   .        Maternal Past Medical History:     Past Medical History:   Diagnosis Date     NO ACTIVE PROBLEMS                        Family History:     Family History   Problem Relation Age of Onset     Family history unknown: Yes              Social History:   Moroccan and English speaking.   Social History   Substance Use Topics     Smoking status: Never Smoker     Smokeless tobacco: Never Used     Alcohol use No            Review of Systems:   The Review of Systems is negative other than noted in the Chief Complaint          Physical Exam:   /72  Resp 20  LMP 2017    Constitutional:   awake, alert, cooperative. Breathing with contractions.      Lungs:   No increased work of breathing, good air exchange, clear to auscultation bilaterally, no crackles or wheezing     Cardiovascular:   Normal apical impulse, regular rate and rhythm, normal S1 and S2, no S3 or S4, and no murmur noted     Abdomen:   Well healed scar from LTCS, soft between contractions,  non-tender. Cephalic by leopolds. Bedside ultrasound confirms. EFW 8-/ to 9#     Ext: DTRs wnl.   Cervix:   Membranes: intact, BBOW   Dilation: 9   Effacement: 100%   Station:-1   Consistency:  soft   Position: Anterior  Presentation:Cephalic  FHR: 135 with moderate variability, accelerations present. Occasional variable decelerations.   Tocometer: contractions every 1 to 2-1/2  Minutes, lasting 50-70 seconds. Moderate to palpation.                        Assessment:   Shruthi Gay is a 40w1d pregnant female admitted with active labor at term.   Trial of Labor after    Fetal Heart Rate Tracing: Category 1   GBS negative   Thrombocytopenia         Plan:   Admit - see IP orders  TOLAC labs drawn. IV placed.   Anticipate     CARMEN Cartwright CNM

## 2017-10-25 LAB — HGB BLD-MCNC: 10.6 G/DL (ref 11.7–15.7)

## 2017-10-25 PROCEDURE — 12000028 ZZH R&B OB UMMC

## 2017-10-25 PROCEDURE — 25000132 ZZH RX MED GY IP 250 OP 250 PS 637: Performed by: ADVANCED PRACTICE MIDWIFE

## 2017-10-25 PROCEDURE — 36415 COLL VENOUS BLD VENIPUNCTURE: CPT | Performed by: ADVANCED PRACTICE MIDWIFE

## 2017-10-25 PROCEDURE — 85018 HEMOGLOBIN: CPT | Performed by: ADVANCED PRACTICE MIDWIFE

## 2017-10-25 RX ADMIN — DOCUSATE SODIUM 100 MG: 100 CAPSULE, LIQUID FILLED ORAL at 10:08

## 2017-10-25 RX ADMIN — ACETAMINOPHEN 650 MG: 325 TABLET, FILM COATED ORAL at 03:40

## 2017-10-25 RX ADMIN — IBUPROFEN 800 MG: 400 TABLET ORAL at 14:26

## 2017-10-25 RX ADMIN — ACETAMINOPHEN 650 MG: 325 TABLET, FILM COATED ORAL at 15:51

## 2017-10-25 RX ADMIN — IBUPROFEN 800 MG: 400 TABLET ORAL at 20:37

## 2017-10-25 RX ADMIN — ACETAMINOPHEN 650 MG: 325 TABLET, FILM COATED ORAL at 08:57

## 2017-10-25 RX ADMIN — DOCUSATE SODIUM 100 MG: 100 CAPSULE, LIQUID FILLED ORAL at 20:37

## 2017-10-25 RX ADMIN — IBUPROFEN 800 MG: 400 TABLET ORAL at 07:26

## 2017-10-25 RX ADMIN — IBUPROFEN 800 MG: 400 TABLET ORAL at 00:54

## 2017-10-25 NOTE — PLAN OF CARE
Problem: Patient Care Overview  Goal: Plan of Care/Patient Progress Review  Outcome: Adequate for Discharge Date Met:  10/25/17  Patient doing very well. Encouraged to get up and walk in hallway and room more. Pain well controlled with ibuprofen. Encouraged mom to breastfeed baby. Giving formula as well. Full assessment on mom and VS WDL. Plan for d/c to home tomorrow.

## 2017-10-25 NOTE — DISCHARGE INSTRUCTIONS
Vaginal Delivery Discharge Instructions: [facility]  Waxqabadka:     Waydiiso qoyska iyo saaxibadaa inay ku caawiyaan markaad u baahantahay.    Waxba levy kor saarin ama levy galin farjigaaga ilaa uu dhakhtarkaagu ansixiyo.    Si fudud u qaado dhowrka asbuuc e xiga si aad u ogalaato in jirkaagu carmen kabto. Waxaad samayn kartaa howl kasta oo aad rabto ilaa meeshaas laga gaarayo.    Gaadhi levy wadin markaad qaadanayso  kaniiniyada xanuunka ee dhkhatarku kuu qoray . waxaad gaadhi wadi kartaa haddii aad qaadanayso kaniiniyada xanuunka ee koontarka.    Wac bixiyahaaga daryeelka caafimaaad haddii aad qabtid mid ka mid ah calaamadahan carmen socda:    Haddii suufka dhiigga nuuga uu ku buuxsamo 1 saac gudihiis, ama aad aragto xinjiro dhiig ah oo ka wayn kubadda golafka.     Dhiig soconaya wax kabadan 6 asbuuc.    Haddii aad qabto dheecaan farjiga ka imaanaya oo  si xun u uraya.     [facility]  F (38  C) am aka sii saraysa (heerkulka laga qaaday carabka hoostiiisa), oo qarqaryo leh ama aan lahayn     Xanuun, nabar, majiirid daran oo aad ka dareeto qaybta hoose ee ubucda.    Xanuun sii kordya, barar, guduudasho ama dheecaan ka dhiimaya meesha la tolay ee qaliinka.    Kaadi badan oo dagdag ah oo markasta ku qabanaysa , ama gubasho aad dareento markaad kaajayso.    Guduudasho, barar, ama xanuun aad ka dareento xididada lugta.    Dhibaato kaa haysata naas nuujinta, ama guduudasho ama xanuun naaska ah.    Xanuun sii kordhaya ama aan ka dhamaanayn meesha la tolay ama danqashada dilaaca.    Lalabbo ama matag.    Xabad xanuun iyo qufac ama naqaska oo kulluvia longo.    Dhibaato ay la socoto murugthai, twila, aa angélica.     Haddii aad qabtid wax angélica bhatia oo ku saabslia inaad waxyeelayso naftaada ama ilmaha, wac corey rhondabunny soliziiannelise.     Haddii aad qabto zurita aalo jacob victor noqoto kadib.    Gacmahaagga nadiifi:  Markasta dhaqa gacahaaga ka hor inta aadan taaban farjiga agagaarkiisa  iyo meesha la tolay. John landa  caawiniaysaa stephan meloado infakshanku. Hdii gacmahaagu emilee stallworth gacmaha valente tirtireulalio dao u nadiifiso gacmahaaga. Baronimyrtle nadiifi ooo jar.      Vaginal Delivery Discharge Instructions  Activity:     Ask family and friends for help when you need it.    Do not place anything in your vagina until your doctor approves.    Take it easy for the next few weeks to allow your body to recover. You may do any activities you feel up to at that point.    Do not drive while taking pain pills prescribed by your doctor. You may drive if taking over-the-counter pain pills.    Call your health care provider if you have any of these symptoms:    You soak a sanitary pad with blood within 1 hour, or you see blood clots larger than a golf ball.    Bleeding that lasts more than 6 weeks.    You have vaginal discharge that smells bad.     A fever of 100.4  F (38  C) or higher (temperature taken under your tongue), with or without chills     Severe, pain, cramping or tenderness in your lower belly area.    Increased pain, swelling, redness or fluid around your stitches.    A more frequent or urgent need to urinate (pee), or it burns when you pee.    Redness, swelling or pain around a vein in your leg.    Problems breastfeeding, or a red or painful area on your breast.    Pain that increases or does not go away from an episiotomy or perineal tear.    Nausea and vomiting.    Chest pain and cough or are gasping for air.    Problems coping with sadness, anxiety, or depression.     If you have any concerns about hurting yourself or the baby, call your doctor right away.      You have questions or concerns after you return home.    Keep your hands clean:  Always wash your hands before touching your perineal area and stitches.  This helps reduce your risk of infection.  If your hands aren t dirty, you may use an alcohol hand-rub to clean your hands. Keep your nails clean and short.

## 2017-10-25 NOTE — PROGRESS NOTES
[facility] Obstetrics Post-Partum Progress Note          Interval History:   Doing well.  Pain is well-controlled with po ibuprofen.  No fever and no history of foul-smelling vaginal discharge.  Good appetite.  Denies chest pain, shortness of breath, nausea or vomiting.  Vaginal bleeding is similar to a heavy menstrual flow. Ambulatory without dizziness. Has not had BM, would like stool softener. Working on breastfeeding baby boy with RN assistance, some issues with latch, baby has lost 7%. Considering minipill for contraception. Desires ibuprofen, tylenol, and colace at discharge.          Significant Problems:      Patient Active Problem List   Diagnosis     Obesity     TB lung, latent     High-risk pregnancy in third trimester     Previous  delivery, antepartum condition or complication     Thrombocytopenia (H)     Encounter for triage in pregnant patient     Labor and delivery, indication for care      (vaginal birth after )             Review of Systems:    The patient denies any chest pain, shortness of breath, excessive pain, fever, chills, nausea or vomiting.          Medications:   All medications related to the patient's hospitalization have been reviewed          Physical Exam:   /82  Pulse 63  Temp 97.6  F (36.4  C) (Oral)  Resp 16  LMP 2017  Breastfeeding? Yes  Breasts: soft, intact  Abdomen: Fundus firm at U/2, NT  Perineum: healing well, no edema   Lochia: scant rubra, no clots  Ext: negative           Data:     Hemoglobin   Date Value Ref Range Status   10/25/2017 10.6 (L) 11.7 - 15.7 g/dL Final          Assessment and Plan:    Assessment:   Post-partum day #1  Vaginal Birth after    L&D complications: None    Lactating mother       Plan:   Breast feeding strategies discussed. Reviewed return to exercise, activity. Discussed contraception, considering minipill. Colace today. Tucks pads/tub soaks prn for perineal discomfort. Pain control measures as  needed. Anticipate discharge tomorrow on pp day #2.Ibuprofen, colace, and tylenol at discharge.        CARMEN Cartwright CNM

## 2017-10-25 NOTE — PLAN OF CARE
Problem: Postpartum (Vaginal Delivery) (Adult,Obstetrics,Pediatric)  Goal: Signs and Symptoms of Listed Potential Problems Will be Absent, Minimized or Managed (Postpartum)  Signs and symptoms of listed potential problems will be absent, minimized or managed by discharge/transition of care (reference Postpartum (Vaginal Delivery) (Adult,Obstetrics,Pediatric) CPG).   Outcome: Improving  Postpartum stable. Voiding adequate amounts. Tolerating diet. Ambulating independent in room. Bonding well with baby. Attempting breastfeeding and giving formula to  as well. Encouraged/educated mother to hand express when baby is taking formula. Continue to monitor.

## 2017-10-25 NOTE — PLAN OF CARE
Problem: Patient Care Overview  Goal: Plan of Care/Patient Progress Review  Outcome: No Change  VSS. Afebrile. Up to bathroom and voiding. C/o cramping often. Alternating between tylenol and ibuprofen but declined oxycodone. Breast and formula feeding. Denies HA, blurred vision, CP, SOB. Some LE edema noted. Friend at bedside helping out. Will continue to monitor.

## 2017-10-26 ENCOUNTER — OFFICE VISIT (OUTPATIENT)
Dept: INTERPRETER SERVICES | Facility: CLINIC | Age: 32
End: 2017-10-26

## 2017-10-26 VITALS
DIASTOLIC BLOOD PRESSURE: 76 MMHG | HEART RATE: 73 BPM | RESPIRATION RATE: 14 BRPM | SYSTOLIC BLOOD PRESSURE: 117 MMHG | TEMPERATURE: 98.1 F

## 2017-10-26 PROCEDURE — T1013 SIGN LANG/ORAL INTERPRETER: HCPCS | Mod: U3

## 2017-10-26 PROCEDURE — 25000132 ZZH RX MED GY IP 250 OP 250 PS 637: Performed by: ADVANCED PRACTICE MIDWIFE

## 2017-10-26 RX ORDER — FERROUS SULFATE 325(65) MG
325 TABLET ORAL
Qty: 60 TABLET | Refills: 0 | Status: SHIPPED | OUTPATIENT
Start: 2017-10-26

## 2017-10-26 RX ORDER — IBUPROFEN 400 MG/1
400-800 TABLET, FILM COATED ORAL EVERY 6 HOURS PRN
Qty: 60 TABLET | Refills: 1 | Status: SHIPPED | OUTPATIENT
Start: 2017-10-26

## 2017-10-26 RX ORDER — DOCUSATE SODIUM 100 MG/1
100 CAPSULE, LIQUID FILLED ORAL 2 TIMES DAILY PRN
Qty: 60 CAPSULE | Refills: 1 | Status: SHIPPED | OUTPATIENT
Start: 2017-10-26

## 2017-10-26 RX ORDER — ACETAMINOPHEN 325 MG/1
650 TABLET ORAL EVERY 4 HOURS PRN
Qty: 60 TABLET | Refills: 1 | Status: SHIPPED | OUTPATIENT
Start: 2017-10-26

## 2017-10-26 RX ADMIN — IBUPROFEN 800 MG: 400 TABLET ORAL at 09:06

## 2017-10-26 RX ADMIN — IBUPROFEN 800 MG: 400 TABLET ORAL at 02:41

## 2017-10-26 RX ADMIN — ACETAMINOPHEN 650 MG: 325 TABLET, FILM COATED ORAL at 00:48

## 2017-10-26 NOTE — DISCHARGE SUMMARY
Postpartum Note and Discharge Summary  Postpartum Day #2  SIGNIFICANT PROBLEMS:  Patient Active Problem List    Diagnosis Date Noted     Encounter for triage in pregnant patient 10/24/2017     Priority: Medium     Labor and delivery, indication for care 10/24/2017     Priority: Medium      (vaginal birth after ) 10/24/2017     Priority: Medium     Thrombocytopenia (H) 2017     Priority: Medium     17: Plt 149  17 Plt 119  10/13/17: Pt declines to go to lab today  10/19 declined CBC draw on admit        High-risk pregnancy in third trimester 2017     Priority: Medium     17: CHIDI at 27 weeks from [facility]. Records received following CHIDI visit.  Onset care 3/3/17 x4 visits  Previous c/s with 2 successful VBACs. TOLAC consent signed 17   Ultrasounds on 3/24/17, 17, 17, 17 and 17  Level II 17: wnl  Birth preferences reviewed: Un-Medicated, Labor support:  Family,  Feeding plans : Breastfeed, Contraception planned:  Unsure           Previous  delivery, antepartum condition or complication 2017     Priority: Medium     Hx of  x2, TOLAC consent signed.        TB lung, latent 2014     Priority: Medium     S/p tx       Obesity 2012     Priority: Medium     ADMISSION DIAGNOSIS:  Labor and Delivery  DISCHARGE DIAGNOSIS:    HOSPITAL COURSE:  40w1d  Shruthi Gay is a 32 year old female      Pt was admitted to the [facility]place on 10/24/2017  3:40 AM in active labor.     INTERVAL HISTORY:  /76  Pulse 73  Temp 98.1  F (36.7  C) (Oral)  Resp 14  LMP 2017  Breastfeeding? Unknown  Pt stable, baby is rooming in.   Breastfeeding status: initiated and going well.  Reports baby favors R breast, having issues latching L breast. Denies pain with latch.  Complications since 2 hours post delivery: None  Patient is tolerating activity well. Voiding without difficulty. Cramping is  minimal and relieved by ibuprofen.  Lochia is decreasing and patient denies clots.  Perineal pain is is minimal.  The perineum is intact.    Physical Exam:   Breasts: soft, nontender, nipples intact  Abdomen: soft, nontender, fundus at U  Lochia: scant, rubra, no clots or odor  Perineum: intact, no edema  Extremities: trace edema, neg Sera's    Postpartum hemoglobin   Hemoglobin   Date Value Ref Range Status   10/25/2017 10.6 (L) 11.7 - 15.7 g/dL Final      Prenatal Labs:   Lab Results   Component Value Date    ABO A 10/24/2017    RH Pos 10/24/2017    AS Neg 10/24/2017    HEPBANG non reactive 2017    TREPAB Negative 10/24/2017    RUQIGG immune 2017    HIV Negative 2011     Rubella: immune  History of depression: denies. Postpartum depression warning signs reviewed.    ASSESSMENT/PLAN:  Normal postpartum exam, stable Post-partum day #2  Complications:anemic, plan for iron supplementation  Plan d/c home tomorrow. Home Visit Ordered- No  RTC 6 weeks  Postpartum warning s/s reviewed, including bleeding/clots, fever, mastitis, or depression  Continue prenatal vitamins.  Discharged home with ibuprofen, Tylenol, stool softener, and iron supplement.  Birthcontrol planned: Undecided, would like to discuss further at 6 week visit.  Has used Paragard in the past.   PROCEDURES:      Current Discharge Medication List      START taking these medications    Details   !! acetaminophen (TYLENOL) 325 MG tablet Take 2 tablets (650 mg) by mouth every 4 hours as needed for mild pain or fever  Qty: 60 tablet, Refills: 1    Associated Diagnoses:  (vaginal birth after )      ibuprofen (ADVIL/MOTRIN) 400 MG tablet Take 1-2 tablets (400-800 mg) by mouth every 6 hours as needed for other (cramping)  Qty: 60 tablet, Refills: 1    Associated Diagnoses:  (vaginal birth after )      docusate sodium (COLACE) 100 MG capsule Take 1 capsule (100 mg) by mouth 2 times daily as needed for constipation  Qty:  60 capsule, Refills: 1    Associated Diagnoses:  (vaginal birth after )      ferrous sulfate (IRON) 325 (65 FE) MG tablet Take 1 tablet (325 mg) by mouth daily (with breakfast)  Qty: 60 tablet, Refills: 0    Associated Diagnoses:  (vaginal birth after )       !! - Potential duplicate medications found. Please discuss with provider.      CONTINUE these medications which have NOT CHANGED    Details   Prenatal Vit-Fe Fumarate-FA ( PRENATAL VITAMINS) 28-0.8 MG TABS Take 1 tablet by mouth daily  Qty: 60 tablet, Refills: 3    Associated Diagnoses: Pregnant state, incidental      !! acetaminophen (TYLENOL) 325 MG tablet Take 2 tablets (650 mg) by mouth every 6 hours as needed for mild pain  Qty: 100 tablet, Refills: 0    Associated Diagnoses: High-risk pregnancy in second trimester      doxylamine (UNISOM) 25 MG TABS tablet Take 1 tablet (25 mg) by mouth At Bedtime  Qty: 14 each, Refills: 0    Associated Diagnoses: Persistent disorder of initiating or maintaining sleep; High-risk pregnancy in second trimester      order for [facility] Maternity Belt order  Qty: 1 each, Refills: 0    Associated Diagnoses: High-risk pregnancy in second trimester       !! - Potential duplicate medications found. Please discuss with provider.        CARMEN Ivey CNM

## 2017-10-26 NOTE — PLAN OF CARE
Problem: Patient Care Overview  Goal: Plan of Care/Patient Progress Review  Outcome: Improving  Vitals are stable, breast and bottle feeding bay on demand, voiding without difficulty. Going home today.

## 2017-10-26 NOTE — PLAN OF CARE
Problem: Patient Care Overview  Goal: Plan of Care/Patient Progress Review  Data: Vital signs within normal limits. Postpartum checks within normal limits - see flow record. Patient eating and drinking normally. Patient able to empty bladder independently and is up ambulating. No apparent signs of infection. Perineum healing well. Patient performing self cares and is able to care for infant.  Action: Patient medicated with Ibuprofen during the shift for pain. See MAR. Abdominal binder on for comfort.  Patient reassessed within 1 hour after each medication and pain was improved - patient stated she was comfortable. Patient education done about benefits of rooming in with baby and benefits of sleeping close to baby. See flow record.   Response: Positive attachment behaviors observed with infant. No support persons present during the night.    Plan: Anticipate discharge home tomorrow.

## 2017-10-26 NOTE — PLAN OF CARE
Problem: Patient Care Overview  Goal: Plan of Care/Patient Progress Review  Outcome: Improving  Bonding well with baby, holding him frequently. States she is breastfeeding, but has not called for latch check. Reminded to do breast massage and hand expression.  States uterine cramping is managed with ibuprofen, heat, and occasional tylenol. She is aware of probable discharge tomorrow.

## 2017-12-29 ENCOUNTER — OFFICE VISIT (OUTPATIENT)
Dept: OBGYN | Facility: CLINIC | Age: 32
End: 2017-12-29
Attending: ADVANCED PRACTICE MIDWIFE
Payer: COMMERCIAL

## 2017-12-29 VITALS
SYSTOLIC BLOOD PRESSURE: 110 MMHG | BODY MASS INDEX: 31.89 KG/M2 | HEIGHT: 68 IN | HEART RATE: 66 BPM | WEIGHT: 210.4 LBS | DIASTOLIC BLOOD PRESSURE: 76 MMHG

## 2017-12-29 DIAGNOSIS — D69.6 THROMBOCYTOPENIA (H): Primary | ICD-10-CM

## 2017-12-29 DIAGNOSIS — O34.219 VBAC (VAGINAL BIRTH AFTER CESAREAN): ICD-10-CM

## 2017-12-29 DIAGNOSIS — Z30.011 ENCOUNTER FOR INITIAL PRESCRIPTION OF CONTRACEPTIVE PILLS: ICD-10-CM

## 2017-12-29 DIAGNOSIS — K64.4 EXTERNAL HEMORRHOIDS: ICD-10-CM

## 2017-12-29 PROCEDURE — 99212 OFFICE O/P EST SF 10 MIN: CPT | Mod: ZF

## 2017-12-29 RX ORDER — ACETAMINOPHEN AND CODEINE PHOSPHATE 120; 12 MG/5ML; MG/5ML
1 SOLUTION ORAL DAILY
Qty: 84 TABLET | Refills: 3 | Status: SHIPPED | OUTPATIENT
Start: 2017-12-29

## 2017-12-29 NOTE — PROGRESS NOTES
"  Nursing Notes:   Tate Alves, [facility]  2017  1:29 PM  Signed  Chief Complaint   Patient presents with     Postpartum Care     SUBJECTIVE:   Shruthi Gay is here for her 6-week postpartum checkup.     PHQ-9 score: 0  Hx of Abuse:  No    Delivery Date: 10/24/2017.    Delivering provider:  Natasha Ahuja CNM.    Type of delivery:  .  Perineum:  tear, no stitches.     Delivery complications: None  Infant gender:  boy, weight 9 pounds 0 oz.  Feeding Method:   and Bottlefed.  Complications reported with feeding:  none, infant thriving .    Bleeding:  Moderate.  Duration:  3 week.  Menses resumed:  No  Bowel/Urinary problems:  No    Contraception Planned: Consult  She  has not had intercourse since delivery..         ================================================================  Shruthi is a 31 yo  who present today for a post-partum visit and for contraceptive counseling. She reports a positive birth experience. Baby is thriving. She is breastfeeding but has experienced low supply so she is supplementing with formula. Her only concern today is a painful hemorrhoid for which she would like to try medication. She has stool softeners at home that she will try.    ROS: 10 point ROS neg other than the symptoms noted above in the HPI.     EXAM:  /76  Pulse 66  Ht 1.727 m (5' 7.99\")  Wt 95.4 kg (210 lb 6.4 oz)  LMP 2017  BMI 32 kg/m2    General: healthy, alert and no distress  Psych: NEGATIVE  Last PHQ-9 score on record= 0  Breasts:  Lactating, Nipples intact with no lesions   Abdomen: Benign, Soft, flat, non-tender, No masses, organomegaly and Diastasis less than 1-2 FB  Incision:   Vulva:  Normal genitalia and Bartholin's, Urethra, Drummond's normal  Vagina:  Cystcele present with gentle bearing down. Pt reports occasional leaking of urine with laughing or coughing but does not desire further evaluation or follow up at this time.   Cervix:  Speculum exam deferred  Uterus:  fully " involuted and non-tender    Adnexa:  Within normal limits and No masses, nodularity, tenderness  Recto-vaginal:   hemorrhoids externally, not thrombosed      ASSESSMENT:   Encounter Diagnoses   Name Primary?     Thrombocytopenia (H) Yes      (vaginal birth after )      Postpartum care and examination of lactating mother      External hemorrhoids      Encounter for initial prescription of contraceptive pills       Normal postpartum exam after   Pregnancy was complicated by:  none.      PLAN:  Orders Placed This Encounter   Medications     hydrocortisone (ANUSOL-HC) 2.5 % cream     Sig: Place rectally 2 times daily     Dispense:  30 g     Refill:  0     norethindrone (MICRONOR) 0.35 MG per tablet     Sig: Take 1 tablet (0.35 mg) by mouth daily     Dispense:  84 tablet     Refill:  3        Risks and benefits of prescribed medications discussed.  Medication instructions reviewed.  Contraception methods discussed. Pt desires POP. Correct use reviewed.   Discussed calcium intake, vitamins and supplements including Vitamin D  Exercise encouraged  Increase fiber intake  Increase fluid intake  Follow up in 1 year for WWE, sooner prn.     Ryanne Vigil, CARMEN CNM

## 2017-12-29 NOTE — MR AVS SNAPSHOT
After Visit Summary   2017    Shruthi Bustos Victor Hugo    MRN: [id number]           Patient Information     Date Of Birth          1985        Visit Information        Provider Department      2017 1:00 PM Ryanne Vigil APRN CNM [facility]        Today's Diagnoses     Thrombocytopenia (H)    -  1     (vaginal birth after )        Postpartum care and examination of lactating mother        External hemorrhoids        Encounter for initial prescription of contraceptive pills           Follow-ups after your visit        Follow-up notes from your care team     Return for Annual Well-Woman Exam.      Who to contact     Please call your clinic at [phone] to:    Ask questions about your health    Make or cancel appointments    Discuss your medicines    Learn about your test results    Speak to your doctor   If you have compliments or concerns about an experience at your clinic, or if you wish to file a complaint, please contact [facility] Patient Relations at [phone] or email us at [email or web address]         Additional Information About Your Visit        MyChart Information     iSoccer is an electronic gateway that provides easy, online access to your medical records. With iSoccer, you can request a clinic appointment, read your test results, renew a prescription or communicate with your care team.     To sign up for iSoccer visit the website at [email or web address]   You will be asked to enter the access code listed below, as well as some personal information. Please follow the directions to create your username and password.     Your access code is: [email or web address]  Expires: 3/27/2018  6:31 AM     Your access code will  in 90 days. If you need help or a new code, please contact your [facility] or call [phone] for assistance.        Care EveryWhere ID     This is  "your Care EveryWhere ID. This could be used by other organizations to access your [facility] medical records  [id number]        Your Vitals Were     Pulse Height Last Period BMI (Body Mass Index)          66 1.727 m (5' 7.99\") 01/22/2017 32 kg/m2         Blood Pressure from Last 3 Encounters:   12/29/17 110/76   10/26/17 117/76   10/19/17 107/68    Weight from Last 3 Encounters:   12/29/17 95.4 kg (210 lb 6.4 oz)   10/19/17 102.5 kg (226 lb)   10/13/17 102.6 kg (226 lb 4.8 oz)              Today, you had the following     No orders found for display         Today's Medication Changes          These changes are accurate as of: 12/29/17  3:47 PM.  If you have any questions, ask your nurse or doctor.               Start taking these medicines.        Dose/Directions    hydrocortisone 2.5 % cream   Commonly known as:  ANUSOL-HC   Used for:  External hemorrhoids   Started by:  Ryanne Vigil APRN CNM        Place rectally 2 times daily   Quantity:  30 g   Refills:  0       norethindrone 0.35 MG per tablet   Commonly known as:  MICRONOR   Used for:  Encounter for initial prescription of contraceptive pills   Started by:  Ryanne Vigil APRN CNM        Dose:  1 tablet   Take 1 tablet (0.35 mg) by mouth daily   Quantity:  84 tablet   Refills:  3            Where to get your medicines      These medications were sent to [facility] [address] [place] [address]) [place] [address]     Phone:  [phone]     hydrocortisone 2.5 % cream    norethindrone 0.35 MG per tablet                Primary Care Provider Office Phone # Fax #    [facility] [phone]       [phone]  [facility] [address]        Equal Access to Services     [facility] LETHA AH: Megan Mccoy, iqra cortez, qacordell zelaya. So [facility] [phone].    ATENCIÓN: Si " calvin bernard, tiene a zurita disposición servicios gratuitos de asistencia lingüística. Marianne hogan [phone].    We comply with applicable federal civil rights laws and [place] laws. We do not discriminate on the basis of race, color, national origin, age, disability, sex, sexual orientation, or gender identity.            Thank you!     Thank you for choosing [facility]  for your care. Our goal is always to provide you with excellent care. Hearing back from our patients is one way we can continue to improve our services. Please take a few minutes to complete the written survey that you may receive in the mail after your visit with us. Thank you!             Your Updated Medication List - Protect others around you: Learn how to safely use, store and throw away your medicines at www.disposemymeds.org.          This list is accurate as of: 17  3:47 PM.  Always use your most recent med list.                   Brand Name Dispense Instructions for use Diagnosis    * acetaminophen 325 MG tablet    TYLENOL    100 tablet    Take 2 tablets (650 mg) by mouth every 6 hours as needed for mild pain    High-risk pregnancy in second trimester       * acetaminophen 325 MG tablet    TYLENOL    60 tablet    Take 2 tablets (650 mg) by mouth every 4 hours as needed for mild pain or fever     (vaginal birth after )       docusate sodium 100 MG capsule    COLACE    60 capsule    Take 1 capsule (100 mg) by mouth 2 times daily as needed for constipation     (vaginal birth after )       doxylamine 25 MG Tabs tablet    UNISOM    14 each    Take 1 tablet (25 mg) by mouth At Bedtime    Persistent disorder of initiating or maintaining sleep, High-risk pregnancy in second trimester       ferrous sulfate 325 (65 FE) MG tablet    IRON    60 tablet    Take 1 tablet (325 mg) by mouth daily (with breakfast)     (vaginal birth after )       hydrocortisone 2.5 % cream    ANUSOL-HC    30 g     Place rectally 2 times daily    External hemorrhoids       ibuprofen 400 MG tablet    ADVIL/MOTRIN    60 tablet    Take 1-2 tablets (400-800 mg) by mouth every 6 hours as needed for other (cramping)     (vaginal birth after )       norethindrone 0.35 MG per tablet    MICRONOR    84 tablet    Take 1 tablet (0.35 mg) by mouth daily    Encounter for initial prescription of contraceptive pills       order for DME     1 each    Maternity Belt order    High-risk pregnancy in second trimester       SM PRENATAL VITAMINS 28-0.8 MG Tabs     60 tablet    Take 1 tablet by mouth daily    Pregnant state, incidental       * Notice:  This list has 2 medication(s) that are the same as other medications prescribed for you. Read the directions carefully, and ask your doctor or other care provider to review them with you.

## 2017-12-29 NOTE — LETTER
"2017       RE: Shruthi Gay  [address]  [place] [address]     Dear Colleague,    Thank you for referring your patient, Shruthi Gay, to the [facility] at [facility]. Please see a copy of my visit note below.      Nursing Notes:   Tate Alves, ABDI  2017  1:29 PM  Signed  Chief Complaint   Patient presents with     Postpartum Care     SUBJECTIVE:   Shruthi Gay is here for her 6-week postpartum checkup.     PHQ-9 score: 0  Hx of Abuse:  No    Delivery Date: 10/24/2017.    Delivering provider:  Natasha Ahuja CNM.    Type of delivery:  .  Perineum:  tear, no stitches.     Delivery complications: None  Infant gender:  boy, weight 9 pounds 0 oz.  Feeding Method:   and Bottlefed.  Complications reported with feeding:  none, infant thriving .    Bleeding:  Moderate.  Duration:  3 week.  Menses resumed:  No  Bowel/Urinary problems:  No    Contraception Planned: Consult  She  has not had intercourse since delivery..         ================================================================  Shruthi is a 31 yo  who present today for a post-partum visit and for contraceptive counseling. She reports a positive birth experience. Baby is thriving. She is breastfeeding but has experienced low supply so she is supplementing with formula. Her only concern today is a painful hemorrhoid for which she would like to try medication. She has stool softeners at home that she will try.    ROS: 10 point ROS neg other than the symptoms noted above in the HPI.     EXAM:  /76  Pulse 66  Ht 1.727 m (5' 7.99\")  Wt 95.4 kg (210 lb 6.4 oz)  LMP 2017  BMI 32 kg/m2    General: healthy, alert and no distress  Psych: NEGATIVE  Last PHQ-9 score on record= 0  Breasts:  Lactating, Nipples intact with no lesions   Abdomen: Benign, Soft, flat, non-tender, No masses, organomegaly and Diastasis less than 1-2 FB  Incision: "   Vulva:  Normal genitalia and Bartholin's, Urethra, [place]'s normal  Vagina:  Cystcele present with gentle bearing down. Pt reports occasional leaking of urine with laughing or coughing but does not desire further evaluation or follow up at this time.   Cervix:  Speculum exam deferred  Uterus:  fully involuted and non-tender    Adnexa:  Within normal limits and No masses, nodularity, tenderness  Recto-vaginal:   hemorrhoids externally, not thrombosed      ASSESSMENT:   Encounter Diagnoses   Name Primary?     Thrombocytopenia (H) Yes      (vaginal birth after )      Postpartum care and examination of lactating mother      External hemorrhoids      Encounter for initial prescription of contraceptive pills       Normal postpartum exam after   Pregnancy was complicated by:  none.      PLAN:  Orders Placed This Encounter   Medications     hydrocortisone (ANUSOL-HC) 2.5 % cream     Sig: Place rectally 2 times daily     Dispense:  30 g     Refill:  0     norethindrone (MICRONOR) 0.35 MG per tablet     Sig: Take 1 tablet (0.35 mg) by mouth daily     Dispense:  84 tablet     Refill:  3        Risks and benefits of prescribed medications discussed.  Medication instructions reviewed.  Contraception methods discussed. Pt desires POP. Correct use reviewed.   Discussed calcium intake, vitamins and supplements including Vitamin D  Exercise encouraged  Increase fiber intake  Increase fluid intake  Follow up in 1 year for WWE, sooner prn.     CARMEN Hill CNM

## 2020-10-08 ENCOUNTER — OFFICE VISIT (OUTPATIENT)
Dept: OBGYN | Facility: CLINIC | Age: 35
End: 2020-10-08
Attending: OBSTETRICS & GYNECOLOGY
Payer: COMMERCIAL

## 2020-10-08 VITALS — DIASTOLIC BLOOD PRESSURE: 71 MMHG | SYSTOLIC BLOOD PRESSURE: 107 MMHG | WEIGHT: 132 LBS | HEART RATE: 81 BPM

## 2020-10-08 DIAGNOSIS — Z31.41 FERTILITY TESTING: Primary | ICD-10-CM

## 2020-10-08 DIAGNOSIS — N97.9 FEMALE INFERTILITY, SECONDARY: ICD-10-CM

## 2020-10-08 PROCEDURE — G0463 HOSPITAL OUTPT CLINIC VISIT: HCPCS

## 2020-10-08 PROCEDURE — 99202 OFFICE O/P NEW SF 15 MIN: CPT | Performed by: OBSTETRICS & GYNECOLOGY

## 2020-10-08 ASSESSMENT — PAIN SCALES - GENERAL: PAINLEVEL: NO PAIN (0)

## 2020-10-08 ASSESSMENT — PATIENT HEALTH QUESTIONNAIRE - PHQ9: SUM OF ALL RESPONSES TO PHQ QUESTIONS 1-9: 1

## 2020-10-08 NOTE — LETTER
Date:October 14, 2020      Patient was self referred, no letter generated. Do not send.        [facility] Health Information

## 2020-10-08 NOTE — LETTER
10/8/2020       RE: Alfredo Horn  [address]  [id number]  [place] [address]     Dear Colleague,    Thank you for referring your patient, Alfredo Horn, to the [facility] [place] [facility]. Please see a copy of my visit note below.    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  She has a history of a previous c/s for failed post dates IOL 3.5 years ago.   Her post operative course was complicated by infection/pelvic abscess that was drained by IR.  She reports that she was in the hospital for 9 days on antibiotics.  She had been avoiding pregnancy until 10 months ago.  Her  periods are regular with no recent change.  Her  is 7-8 years older that she is and is healthy.  It only took 4 months to conceive previously.         HISTORIES:  There is no problem list on file for this patient.    Past Medical History:   Diagnosis Date     NO ACTIVE PROBLEMS      No past surgical history on file.  No current outpatient medications on file.     Allergies   Allergen Reactions     No Clinical Screening - See Comments Other (See Comments)     Does not eat pork.  No gelatin.      Social History     Socioeconomic History     Marital status:      Spouse name: Not on file     Number of children: Not on file     Years of education: Not on file     Highest education level: Not on file   Occupational History     Not on file   Social Needs     Financial resource strain: Not on file     Food insecurity     Worry: Not on file     Inability: Not on file     Transportation needs     Medical: Not on file     Non-medical: Not on file   Tobacco Use     Smoking status: Never Smoker     Smokeless tobacco: Never Used   Substance and Sexual Activity     Alcohol use: No     Drug use: No     Sexual activity: Not on file   Lifestyle     Physical activity     Days per week: Not on file     Minutes per session: Not on file      Stress: Not on file   Relationships     Social connections     Talks on phone: Not on file     Gets together: Not on file     Attends [employer] service: Not on file     Active member of club or organization: Not on file     Attends meetings of clubs or organizations: Not on file     Relationship status: Not on file     Intimate partner violence     Fear of current or ex partner: Not on file     Emotionally abused: Not on file     Physically abused: Not on file     Forced sexual activity: Not on file   Other Topics Concern     Not on file   Social History Narrative     Not on file     No family history on file.       Gyn Hx:   Patient's last menstrual period was 09/20/2020.  Menses:   LMP 9/20/2020    Review Of Systems:  CONSTITUTIONAL: NEGATIVE for fever, chills  EYES: NEGATIVE for vision changes   RESP: NEGATIVE for significant cough or SOB  CV: NEGATIVE for chest pain, palpitations   GI: NEGATIVE for nausea, abdominal pain, heartburn, or change in bowel habits  : NEGATIVE for frequency, dysuria, or hematuria  MUSCULOSKELETAL: NEGATIVE for significant arthralgias or myalgia  NEURO: NEGATIVE for weakness, dizziness or paresthesias or headache    EXAM:  /71   Pulse 81   Wt 59.9 kg (132 lb)   LMP 09/20/2020   Breastfeeding No   There is no height or weight on file to calculate BMI.    General - pleasant female in no acute distress.  Musculoskeletal - no gross deformities.  Neurological - normal strength, sensation, and mental status.      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  Her history is concerning for tubal occlusion given her history of pelvic abscess after her c/s.    PLAN    Reviewed baseline labs, pelvic ultrasound and HSG.  If these are all normal, then SA for partner.  She was in agreement with this plan.  Orders for day 3, 21 labs, pelvic ultrasound and HSG placed.  Plan to RTC-can be by phone once the above are complete to review the results and discuss next  steps.    Pallavi Burks MD, FACOG        Again, thank you for allowing me to participate in the care of your patient.      Sincerely,    Pallavi Burks MD

## 2020-10-08 NOTE — PROGRESS NOTES
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  She has a history of a previous c/s for failed post dates IOL 3.5 years ago.   Her post operative course was complicated by infection/pelvic abscess that was drained by IR.  She reports that she was in the hospital for 9 days on antibiotics.  She had been avoiding pregnancy until 10 months ago.  Her  periods are regular with no recent change.  Her  is 7-8 years older that she is and is healthy.  It only took 4 months to conceive previously.         HISTORIES:  There is no problem list on file for this patient.    Past Medical History:   Diagnosis Date     NO ACTIVE PROBLEMS      No past surgical history on file.  No current outpatient medications on file.     Allergies   Allergen Reactions     No Clinical Screening - See Comments Other (See Comments)     Does not eat pork.  No gelatin.      Social History     Socioeconomic History     Marital status:      Spouse name: Not on file     Number of children: Not on file     Years of education: Not on file     Highest education level: Not on file   Occupational History     Not on file   Social Needs     Financial resource strain: Not on file     Food insecurity     Worry: Not on file     Inability: Not on file     Transportation needs     Medical: Not on file     Non-medical: Not on file   Tobacco Use     Smoking status: Never Smoker     Smokeless tobacco: Never Used   Substance and Sexual Activity     Alcohol use: No     Drug use: No     Sexual activity: Not on file   Lifestyle     Physical activity     Days per week: Not on file     Minutes per session: Not on file     Stress: Not on file   Relationships     Social connections     Talks on phone: Not on file     Gets together: Not on file     Attends [employer] service: Not on file     Active member of club or organization: Not on file     Attends meetings of clubs or organizations: Not on file     Relationship  status: Not on file     Intimate partner violence     Fear of current or ex partner: Not on file     Emotionally abused: Not on file     Physically abused: Not on file     Forced sexual activity: Not on file   Other Topics Concern     Not on file   Social History Narrative     Not on file     No family history on file.       Gyn Hx:   Patient's last menstrual period was 09/20/2020.  Menses:   LMP 9/20/2020    Review Of Systems:  CONSTITUTIONAL: NEGATIVE for fever, chills  EYES: NEGATIVE for vision changes   RESP: NEGATIVE for significant cough or SOB  CV: NEGATIVE for chest pain, palpitations   GI: NEGATIVE for nausea, abdominal pain, heartburn, or change in bowel habits  : NEGATIVE for frequency, dysuria, or hematuria  MUSCULOSKELETAL: NEGATIVE for significant arthralgias or myalgia  NEURO: NEGATIVE for weakness, dizziness or paresthesias or headache    EXAM:  /71   Pulse 81   Wt 59.9 kg (132 lb)   LMP 09/20/2020   Breastfeeding No   There is no height or weight on file to calculate BMI.    General - pleasant female in no acute distress.  Musculoskeletal - no gross deformities.  Neurological - normal strength, sensation, and mental status.      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  Her history is concerning for tubal occlusion given her history of pelvic abscess after her c/s.    PLAN    Reviewed baseline labs, pelvic ultrasound and HSG.  If these are all normal, then SA for partner.  She was in agreement with this plan.  Orders for day 3, 21 labs, pelvic ultrasound and HSG placed.  Plan to RTC-can be by phone once the above are complete to review the results and discuss next steps.    Pallavi Burks MD, FACOG

## 2020-10-09 ENCOUNTER — TELEPHONE (OUTPATIENT)
Dept: OBGYN | Facility: CLINIC | Age: 35
End: 2020-10-09

## 2020-10-09 DIAGNOSIS — N97.9 FEMALE INFERTILITY: Primary | ICD-10-CM

## 2020-10-09 NOTE — TELEPHONE ENCOUNTER
Talked to patient by phone-  period today- sceduled her for day three labs and day 21.  Also schedule 4 week follow up with Dr. Burks.  Knows to go to inpatient lab on Sunday  And day 21 is October 30 th.  Ill be able to go to outpatient lab for that.                  ----- Message from Savage Masterson sent at 10/9/2020  2:02 PM CDT -----  Regarding: Call back/Dr Burks  Per call from PT is confused about plans from 10/8 visit with Dr Burks. PT wanted to know if she needs to see Dr Burks again on her next period or she just needs labs. PT reported that her period came today. Please reach out to the PT.     Thank You,    Savage    Please DO NOT send this message and/or reply back to sender. Call Center Representatives DO NOT respond to messages

## 2020-10-11 DIAGNOSIS — N97.9 FEMALE INFERTILITY: ICD-10-CM

## 2020-10-11 LAB
ESTRADIOL SERPL-MCNC: 36 PG/ML
FSH SERPL-ACNC: 6.6 IU/L
LH SERPL-ACNC: 5.5 IU/L
PROGEST SERPL-MCNC: <0.2 NG/ML
PROLACTIN SERPL-MCNC: 5 UG/L (ref 3–27)

## 2020-10-11 PROCEDURE — 84144 ASSAY OF PROGESTERONE: CPT | Performed by: OBSTETRICS & GYNECOLOGY

## 2020-10-11 PROCEDURE — 83001 ASSAY OF GONADOTROPIN (FSH): CPT | Performed by: OBSTETRICS & GYNECOLOGY

## 2020-10-11 PROCEDURE — 84146 ASSAY OF PROLACTIN: CPT | Performed by: OBSTETRICS & GYNECOLOGY

## 2020-10-11 PROCEDURE — 36415 COLL VENOUS BLD VENIPUNCTURE: CPT | Performed by: OBSTETRICS & GYNECOLOGY

## 2020-10-11 PROCEDURE — 82670 ASSAY OF TOTAL ESTRADIOL: CPT | Performed by: OBSTETRICS & GYNECOLOGY

## 2020-10-11 PROCEDURE — 83002 ASSAY OF GONADOTROPIN (LH): CPT | Performed by: OBSTETRICS & GYNECOLOGY

## 2020-10-30 DIAGNOSIS — Z31.41 FERTILITY TESTING: ICD-10-CM

## 2020-10-30 LAB — PROGEST SERPL-MCNC: 8.9 NG/ML

## 2020-10-30 PROCEDURE — 36415 COLL VENOUS BLD VENIPUNCTURE: CPT | Performed by: OBSTETRICS & GYNECOLOGY

## 2020-10-30 PROCEDURE — 84144 ASSAY OF PROGESTERONE: CPT | Performed by: OBSTETRICS & GYNECOLOGY

## 2020-11-10 ENCOUNTER — TELEPHONE (OUTPATIENT)
Dept: OBGYN | Facility: CLINIC | Age: 35
End: 2020-11-10

## 2020-12-03 ENCOUNTER — APPOINTMENT (OUTPATIENT)
Dept: INTERPRETER SERVICES | Facility: CLINIC | Age: 35
End: 2020-12-03

## 2020-12-08 ENCOUNTER — TELEPHONE (OUTPATIENT)
Dept: OBGYN | Facility: CLINIC | Age: 35
End: 2020-12-08

## 2020-12-08 DIAGNOSIS — N97.9 FEMALE INFERTILITY: Primary | ICD-10-CM

## 2020-12-08 NOTE — TELEPHONE ENCOUNTER
Message received from patient regarding scheduling HSG.    Spoke with Alfredo and she reports she is currently day 5 of her cycle.    Order is placed for HSG, spoke with radiology and able to schedule HSG 12/09/2020 with Dr. Rm at 1400.     Spoke with patient, instructed patient to present first to [facility] at 1330 for urine pregnancy test. Instructed her that she will then go to radiology for HSG at 1400. Instructed her to abstain from sexual intercourse until after her procedure and that she may pre-medicate with tylenol or ibuprofen an hour prior to her appointment. Explained that she may feel cramping and have light spotting after. She verbalized understanding and agreement to above instructions, she denied further questions.

## 2020-12-08 NOTE — TELEPHONE ENCOUNTER
----- Message from Sandra Partida sent at 12/8/2020  8:53 AM CST -----  Regarding: urgent HSG scheduling  Contact: [phone]  Pt has called now 4 times to schedule her HSG, as she is on her period, called the  and was advised radha is out and she's the only  for HSG, please call pt asap, she missed last month, and now is going to miss this month without getting a call, Alfredo is very frustrated, please call her asap thanks

## 2020-12-09 ENCOUNTER — HOSPITAL ENCOUNTER (OUTPATIENT)
Dept: GENERAL RADIOLOGY | Facility: CLINIC | Age: 35
End: 2020-12-09
Attending: OBSTETRICS & GYNECOLOGY
Payer: COMMERCIAL

## 2020-12-09 ENCOUNTER — ALLIED HEALTH/NURSE VISIT (OUTPATIENT)
Dept: OBGYN | Facility: CLINIC | Age: 35
End: 2020-12-09
Payer: COMMERCIAL

## 2020-12-09 ENCOUNTER — DOCUMENTATION ONLY (OUTPATIENT)
Dept: OBGYN | Facility: CLINIC | Age: 35
End: 2020-12-09

## 2020-12-09 DIAGNOSIS — Z31.41 FERTILITY TESTING: ICD-10-CM

## 2020-12-09 DIAGNOSIS — Z31.41 FERTILITY TESTING: Primary | ICD-10-CM

## 2020-12-09 LAB
HCG UR QL: NEGATIVE
INTERNAL QC OK POCT: YES

## 2020-12-09 PROCEDURE — 81025 URINE PREGNANCY TEST: CPT

## 2020-12-09 PROCEDURE — 58340 CATHETER FOR HYSTEROGRAPHY: CPT

## 2020-12-09 PROCEDURE — 74740 X-RAY FEMALE GENITAL TRACT: CPT

## 2020-12-09 PROCEDURE — 255N000002 HC RX 255 OP 636: Performed by: OBSTETRICS & GYNECOLOGY

## 2020-12-09 PROCEDURE — 74740 X-RAY FEMALE GENITAL TRACT: CPT | Mod: 26 | Performed by: RADIOLOGY

## 2020-12-09 PROCEDURE — 250N000009 HC RX 250: Performed by: OBSTETRICS & GYNECOLOGY

## 2020-12-09 RX ORDER — IOPAMIDOL 510 MG/ML
100 INJECTION, SOLUTION INTRAVASCULAR ONCE
Status: COMPLETED | OUTPATIENT
Start: 2020-12-09 | End: 2020-12-09

## 2020-12-09 RX ADMIN — IOPAMIDOL 35 ML: 510 INJECTION, SOLUTION INTRAVASCULAR at 14:41

## 2020-12-09 RX ADMIN — LIDOCAINE HYDROCHLORIDE 5 ML: 10 INJECTION, SOLUTION EPIDURAL; INFILTRATION; INTRACAUDAL; PERINEURAL at 14:40

## 2020-12-09 NOTE — PROGRESS NOTES
Procedure: Hysterosalpingogram (HSG) Contrast Injection    Name:  Alfredo Horn  MRN:  [id number]  :  1985      Preoperative Diagnosis:  Secondary infertility  Postoperative Diagnosis:  Same, obstruction left fallopian tube  LMP or OC start date:  20  SPT:  negative  Antibiotic prophylaxis: No  :  Gloria Rm MD    Anesthesia:  Local infiltration of 1% Xylocaine ( 10    ml)  Contrast:  ISOVUE-250 ( 40    ml)  Complications:  None    Procedure Description:  The patient was placed in the dorsolithotomy position and a single-hinged vaginal speculum was inserted.  The cervix was exposed and washed with Hibiclens antiseptic solution three times.  Local anesthesia was established with 1% Xylocaine injected into the anterior lip of the cervix utilizing a 22-gauge spinal needle.  The cervix was grasped with a single toothed tenaculum.  The HSG cannula was attached to a 30-cc syringe containing water-soluble contrast (ISOVUE-250) and the cannula was filled with contrast removing air from it.  The tip of the cannula was then introduced into the external cervical os with the cone of the cannula applied tightly to the cervix.  Contrast was injected to image the uterine cavity and fallopian tubes under fluoroscopic monitoring.            Findings:    Uterine cavity:  wnl  Fallopian tubes:  Right: fill and spill. Left: fill without spill  Other:  n/a    Impression:  Left fallopian tube obstruction    Gloria Rm MD

## 2021-01-07 ENCOUNTER — OFFICE VISIT (OUTPATIENT)
Dept: OBGYN | Facility: CLINIC | Age: 36
End: 2021-01-07
Attending: OBSTETRICS & GYNECOLOGY
Payer: COMMERCIAL

## 2021-01-07 VITALS — WEIGHT: 129 LBS | HEART RATE: 90 BPM | DIASTOLIC BLOOD PRESSURE: 73 MMHG | SYSTOLIC BLOOD PRESSURE: 109 MMHG

## 2021-01-07 DIAGNOSIS — N97.9 FEMALE INFERTILITY, SECONDARY: Primary | ICD-10-CM

## 2021-01-07 PROCEDURE — G0463 HOSPITAL OUTPT CLINIC VISIT: HCPCS

## 2021-01-07 PROCEDURE — 99213 OFFICE O/P EST LOW 20 MIN: CPT | Performed by: OBSTETRICS & GYNECOLOGY

## 2021-01-07 ASSESSMENT — PAIN SCALES - GENERAL: PAINLEVEL: NO PAIN (0)

## 2021-01-07 NOTE — LETTER
Date:January 13, 2021      Patient was self referred, no letter generated. Do not send.        [facility] Health Information

## 2021-01-07 NOTE — LETTER
1/7/2021       RE: Alfredo Horn  [address]  [address]  [place] [address]     Dear Colleague,    Thank you for referring your patient, Alfredo Horn, to the [facility] [place] [facility] Please see a copy of my visit note below.    S:  Alfredo Horn is a 35 year old  here with 12 months of secondary infertility.  She has a history of a previous c/s for failed post dates IOL 3.5 years ago.   Her post operative course was complicated by infection/pelvic abscess that was drained by IR.  She had no difficulty conceiving her first pregnancy.  Since her last visit she has had day 3 labs that show normal ovarian reserve, an ovulatory luteal phase progesterone, and an HSG that showed a normal right tube an ovary, the left tube filled but no definite spillage was noted.  She has not done her pelvic ultrasound and her  has not had a SA.    O:  /73   Pulse 90   Wt 58.5 kg (129 lb)   LMP 01/02/2021   Breastfeeding No   gen-pleasant, NAD    Labs, imaging reviewed    A:  34 y/o  with 1 year of secondary infertility.  Evaluation has been normal to date with the exception of possibly left tubal occlusion.    P: Encouraged her to have her  complete his SA-he is not register in the [facility] system so I can not place his order at this time.  She was given information for her partner on how to register and schedule the test.  Encouraged her to make an appointment for a consult with an [facility] clinic as her infertility is not related to oligo-ovulation-either unexplained or male factor at this point- and as such we do not have any treatment options for her at out clinic.  She was agreeable to this and she was given on local [facility] clinics.      Pallavi Burks MD, FACOG        Again, thank you for allowing me to participate in the care of your patient.      Sincerely,    Pallavi Burks MD

## 2021-01-12 NOTE — PROGRESS NOTES
S:  Alfredo Horn is a 35 year old  here with 12 months of secondary infertility.  She has a history of a previous c/s for failed post dates IOL 3.5 years ago.   Her post operative course was complicated by infection/pelvic abscess that was drained by IR.  She had no difficulty conceiving her first pregnancy.  Since her last visit she has had day 3 labs that show normal ovarian reserve, an ovulatory luteal phase progesterone, and an HSG that showed a normal right tube an ovary, the left tube filled but no definite spillage was noted.  She has not done her pelvic ultrasound and her  has not had a SA.    O:  /73   Pulse 90   Wt 58.5 kg (129 lb)   LMP 01/02/2021   Breastfeeding No   gen-pleasant, NAD    Labs, imaging reviewed    A:  34 y/o  with 1 year of secondary infertility.  Evaluation has been normal to date with the exception of possibly left tubal occlusion.    P: Encouraged her to have her  complete his SA-he is not register in the [facility] system so I can not place his order at this time.  She was given information for her partner on how to register and schedule the test.  Encouraged her to make an appointment for a consult with an [facility] clinic as her infertility is not related to oligo-ovulation-either unexplained or male factor at this point- and as such we do not have any treatment options for her at out clinic.  She was agreeable to this and she was given on local [facility] clinics.      Pallavi Burks MD, FACOG

## 2021-01-15 ENCOUNTER — HEALTH MAINTENANCE LETTER (OUTPATIENT)
Age: 36
End: 2021-01-15

## 2021-09-25 ENCOUNTER — HEALTH MAINTENANCE LETTER (OUTPATIENT)
Age: 36
End: 2021-09-25

## 2022-03-12 ENCOUNTER — HEALTH MAINTENANCE LETTER (OUTPATIENT)
Age: 37
End: 2022-03-12

## 2022-10-17 ENCOUNTER — TRANSFERRED RECORDS (OUTPATIENT)
Dept: HEALTH INFORMATION MANAGEMENT | Facility: CLINIC | Age: 37
End: 2022-10-17

## 2022-11-09 ENCOUNTER — TRANSFERRED RECORDS (OUTPATIENT)
Dept: HEALTH INFORMATION MANAGEMENT | Facility: CLINIC | Age: 37
End: 2022-11-09

## 2022-11-09 ENCOUNTER — TRANSCRIBE ORDERS (OUTPATIENT)
Dept: OTHER | Age: 37
End: 2022-11-09

## 2022-11-09 ENCOUNTER — MEDICAL CORRESPONDENCE (OUTPATIENT)
Dept: HEALTH INFORMATION MANAGEMENT | Facility: CLINIC | Age: 37
End: 2022-11-09

## 2022-11-09 DIAGNOSIS — N36.1 URETHRAL DIVERTICULUM: Primary | ICD-10-CM

## 2022-11-10 ENCOUNTER — TELEPHONE (OUTPATIENT)
Dept: UROLOGY | Facility: CLINIC | Age: 37
End: 2022-11-10

## 2022-11-10 NOTE — TELEPHONE ENCOUNTER
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    Patient needs an appointment ASAP per referring provider. Sending message for clinic review and follow-up with patient for scheduling.     Action Taken: Message routed to:  [facility] Urology    Travel Screening: Not Applicable

## 2022-11-14 ENCOUNTER — TRANSFERRED RECORDS (OUTPATIENT)
Dept: HEALTH INFORMATION MANAGEMENT | Facility: CLINIC | Age: 37
End: 2022-11-14

## 2022-12-23 NOTE — NURSING NOTE
Catheter removed over wire.   Chief Complaint   Patient presents with     Postpartum Care     SUBJECTIVE:   Shruthi Gay is here for her 6-week postpartum checkup.     PHQ-9 score: 0  Hx of Abuse:  No    Delivery Date: 10/24/2017.    Delivering provider:  Natasha Ahuja CNM.    Type of delivery:  .  Perineum:  tear, no stitches.     Delivery complications: None  Infant gender:  boy, weight 9 pounds 0 oz.  Feeding Method:   and Bottlefed.  Complications reported with feeding:  none, infant thriving .    Bleeding:  Moderate.  Duration:  3 week.  Menses resumed:  No  Bowel/Urinary problems:  No    Contraception Planned: Consult  She  has not had intercourse since delivery..       unknown

## 2022-12-24 ENCOUNTER — HOSPITAL ENCOUNTER (EMERGENCY)
Facility: CLINIC | Age: 37
End: 2022-12-24

## 2022-12-26 ENCOUNTER — HEALTH MAINTENANCE LETTER (OUTPATIENT)
Age: 37
End: 2022-12-26

## 2022-12-29 NOTE — TELEPHONE ENCOUNTER
Action 2022 JTv 9:23 AM   Action Taken CSS sent and urgent request to [facility] for records.      Action 2023 JTV 12:29 PM 2:18pm   Action Taken CSS sent records to scanning for processing.      Action 2023 JTV 12:34 PM    Action Taken Records are not in chart yet. A copy of the records sent to [facility] to contact Scanning before resending records.        MEDICAL RECORDS REQUEST   [facility]  Urology Clinic  [address] [place] [address]  PHONE: [phone]  Fax: [phone]        FUTURE VISIT INFORMATION                                                   Shruthi Bustos Victor Hugo, : 1985 scheduled for future visit at [facility]    APPOINTMENT INFORMATION:    Date: 02/15/2023    Provider:  Jennifer Hutton MD    Reason for Visit/Diagnosis: Urethral diverticulum    REFERRAL INFORMATION:    Referring provider:  Dr Tristen Adler @ [facility] contact number:  Phone: [phone], Fax: [phone]    RECORDS REQUESTED FOR VISIT                                                     NOTES  STATUS/DETAILS   OFFICE NOTE from referring provider  Yes, 10/17/2022, 2022, 2022 -- Dr. Lionel Adler @ [facility]   OFFICE NOTE from other specialist  Yes, 2014, 07/10/2014 -- Dang Ochoa MD @ [facility]   MEDICATION LIST  yes   LABS     URINALYSIS (UA)  Yes, 2023   IMAGING (IMAGES & REPORT)  no     PRE-VISIT CHECKLIST      Record collection complete yes   Appointment appropriately scheduled           (right time/right provider) Yes   Joint diagnostic appointment coordinated correctly          (ensure right order & amount of time) Yes   MyChart activation No   Questionnaire complete If no, please explain pending

## 2023-02-15 ENCOUNTER — PRE VISIT (OUTPATIENT)
Dept: UROLOGY | Facility: CLINIC | Age: 38
End: 2023-02-15

## 2023-02-15 ENCOUNTER — OFFICE VISIT (OUTPATIENT)
Dept: UROLOGY | Facility: CLINIC | Age: 38
End: 2023-02-15
Attending: OBSTETRICS & GYNECOLOGY
Payer: COMMERCIAL

## 2023-02-15 DIAGNOSIS — N39.3 STRESS INCONTINENCE: Primary | ICD-10-CM

## 2023-02-15 DIAGNOSIS — N36.1 URETHRAL DIVERTICULUM: ICD-10-CM

## 2023-02-15 DIAGNOSIS — N81.11 MIDLINE CYSTOCELE: ICD-10-CM

## 2023-02-15 PROCEDURE — 99204 OFFICE O/P NEW MOD 45 MIN: CPT | Performed by: UROLOGY

## 2023-02-15 NOTE — PATIENT INSTRUCTIONS
Please do pelvic floor physical therapy.    Follow up with Julieta Catherine PA-C in six months to check your symptoms.    It was a pleasure meeting with you today.  Thank you for allowing me and my team the privilege of caring for you today.  YOU are the reason we are here, and I truly hope we provided you with the excellent service you deserve.  Please let us know if there is anything else we can do for you so that we can be sure you are leaving completely satisfied with your care experience.      - Pelvic floor physical therapy  - LUIS follow up in 6 months

## 2023-02-15 NOTE — PROGRESS NOTES
HPI:  Shruthi Gay is a 38 year old female with cystocele.    Referred by Dr. Adler at [facility] for vaginal bulge. Appears to have had previous workup for this issue at [facility] in 3/2022 with Lindsay FORBES. At that time, noted to have grade II cystocele on exam and fit for a pessary. Was also reporting stress incontinence at that time. History of 3 vaginal deliveries and 1 c section.     Today, she reports she was sent here for her vaginal bulge by her primary care but does not have significant symptoms. No pain but feels not completely normal. She does report that she has leakage with sneezing, coughing, laughing. She is interested in having more children.     History gathered with assistance of Macanese speaking .     Reviewed previous notes from Lindsay FORBES from 3/15/2022 and 5/10/2022  And Dr. Andrea from 1/5/23 and Dr. Zheng on 9/15/22.    Exam:  There were no vitals taken for this visit.  General: age-appropriate appearing female in NAD sitting in an exam chair  HEENT: Head AT/NC, EOMI, CN Grossly intact.  Resp: no respiratory distress  CV: heart rate regular  Back: bony spine is non-tender, flanks are non-tender.  : deferred given known exam.      Review of Labs:  The following labs were reviewed by me and discussed with the patient:  Lab Results   Component Value Date    WBC 8.9 10/24/2017     Lab Results   Component Value Date    RBC 4.21 10/24/2017     Lab Results   Component Value Date    HGB 10.6 10/25/2017     Lab Results   Component Value Date    HCT 39.3 10/24/2017     Lab Results   Component Value Date    MCV 93 10/24/2017     Lab Results   Component Value Date    MCH 31.4 10/24/2017     Lab Results   Component Value Date    MCHC 33.6 10/24/2017     Lab Results   Component Value Date    RDW 15.3 10/24/2017     Lab Results   Component Value Date     10/24/2017        Last Comprehensive Metabolic Panel:  Sodium   Date Value Ref Range Status   10/03/2017 138  133 - 144 mmol/L Final     Potassium   Date Value Ref Range Status   10/03/2017 3.6 3.4 - 5.3 mmol/L Final     Chloride   Date Value Ref Range Status   10/03/2017 108 94 - 109 mmol/L Final     Carbon Dioxide   Date Value Ref Range Status   10/03/2017 21 20 - 32 mmol/L Final     Anion Gap   Date Value Ref Range Status   10/03/2017 9 3 - 14 mmol/L Final     Glucose   Date Value Ref Range Status   10/03/2017 92 70 - 99 mg/dL Final     Urea Nitrogen   Date Value Ref Range Status   10/03/2017 4 (L) 7 - 30 mg/dL Final     Creatinine   Date Value Ref Range Status   10/03/2017 0.54 0.52 - 1.04 mg/dL Final     GFR Estimate   Date Value Ref Range Status   10/03/2017 >90 >60 mL/min/1.7m2 Final     Comment:     Non  GFR Calc     Calcium   Date Value Ref Range Status   10/03/2017 8.7 8.5 - 10.1 mg/dL Final     Bilirubin Total   Date Value Ref Range Status   10/03/2017 0.4 0.2 - 1.3 mg/dL Final     Alkaline Phosphatase   Date Value Ref Range Status   10/03/2017 211 (H) 40 - 150 U/L Final     ALT   Date Value Ref Range Status   10/03/2017 21 0 - 50 U/L Final     AST   Date Value Ref Range Status   10/03/2017 21 0 - 45 U/L Final                Color Urine (no units)   Date Value   06/01/2015 Red     Appearance Urine (no units)   Date Value   06/01/2015 Cloudy     Glucose Urine (mg/dL)   Date Value   06/01/2015 30 (A)     Bilirubin Urine (no units)   Date Value   06/01/2015 Negative     Ketones Urine (mg/dL)   Date Value   06/01/2015 Negative     Specific Gravity Urine (no units)   Date Value   06/01/2015 1.026     pH Urine (pH)   Date Value   06/01/2015 8.0 (H)     Protein Albumin Urine (mg/dL)   Date Value   06/01/2015 >600 (A)     Urobilinogen Urine (EU/dL)   Date Value   10/06/2011 0.2     Nitrite Urine (no units)   Date Value   06/01/2015 Negative     Leukocyte Esterase Urine (no units)   Date Value   06/01/2015 Negative          Assessment & Plan   38 year old female with history of grade II cystocele and NEHA.  Discussed that given she is interested in having additional children, would not recommend surgical management. Would rather recommend conservative management first with pelvic floor physical therapy.  - Pelvic floor physical therapy  - LUIS follow up in 6 months    Patient was seen, evaluated and plan was formulated in conjunction with me and I agree with the above.  Jennifer Hutton MD    [facility] [place]      ==========================      Additional Coding Information:    Time spent:  48 minutes spent on the date of the encounter doing chart review, history and exam, documentation and further activities per the note

## 2023-02-15 NOTE — LETTER
2/15/2023       RE: Shruthi Gay  [address]  [place] [address]     Dear Colleague,    Thank you for referring your patient, Shruthi Gay, to the [facility] [place] at [facility]. Please see a copy of my visit note below.    HPI:  Shruthi Gay is a 38 year old female with cystocele.    Referred by Dr. Adler at [facility] for vaginal bulge. Appears to have had previous workup for this issue at [facility] in 3/2022 with Lindsay FORBES. At that time, noted to have grade II cystocele on exam and fit for a pessary. Was also reporting stress incontinence at that time. History of 3 vaginal deliveries and 1 c section.     Today, she reports she was sent here for her vaginal bulge by her primary care but does not have significant symptoms. No pain but feels not completely normal. She does report that she has leakage with sneezing, coughing, laughing. She is interested in having more children.     History gathered with assistance of Albanian speaking .     Reviewed previous notes from Lindsay FORBES from 3/15/2022 and 5/10/2022  And Dr. Andrea from 1/5/23 and Dr. Zheng on 9/15/22.    Exam:  There were no vitals taken for this visit.  General: age-appropriate appearing female in NAD sitting in an exam chair  HEENT: Head AT/NC, EOMI, CN Grossly intact.  Resp: no respiratory distress  CV: heart rate regular  Back: bony spine is non-tender, flanks are non-tender.  : deferred given known exam.      Review of Labs:  The following labs were reviewed by me and discussed with the patient:  Lab Results   Component Value Date    WBC 8.9 10/24/2017     Lab Results   Component Value Date    RBC 4.21 10/24/2017     Lab Results   Component Value Date    HGB 10.6 10/25/2017     Lab Results   Component Value Date    HCT 39.3 10/24/2017     Lab Results   Component Value Date    MCV 93 10/24/2017     Lab Results   Component  Value Date    MCH 31.4 10/24/2017     Lab Results   Component Value Date    MCHC 33.6 10/24/2017     Lab Results   Component Value Date    RDW 15.3 10/24/2017     Lab Results   Component Value Date     10/24/2017        Last Comprehensive Metabolic Panel:  Sodium   Date Value Ref Range Status   10/03/2017 138 133 - 144 mmol/L Final     Potassium   Date Value Ref Range Status   10/03/2017 3.6 3.4 - 5.3 mmol/L Final     Chloride   Date Value Ref Range Status   10/03/2017 108 94 - 109 mmol/L Final     Carbon Dioxide   Date Value Ref Range Status   10/03/2017 21 20 - 32 mmol/L Final     Anion Gap   Date Value Ref Range Status   10/03/2017 9 3 - 14 mmol/L Final     Glucose   Date Value Ref Range Status   10/03/2017 92 70 - 99 mg/dL Final     Urea Nitrogen   Date Value Ref Range Status   10/03/2017 4 (L) 7 - 30 mg/dL Final     Creatinine   Date Value Ref Range Status   10/03/2017 0.54 0.52 - 1.04 mg/dL Final     GFR Estimate   Date Value Ref Range Status   10/03/2017 >90 >60 mL/min/1.7m2 Final     Comment:     Non  GFR Calc     Calcium   Date Value Ref Range Status   10/03/2017 8.7 8.5 - 10.1 mg/dL Final     Bilirubin Total   Date Value Ref Range Status   10/03/2017 0.4 0.2 - 1.3 mg/dL Final     Alkaline Phosphatase   Date Value Ref Range Status   10/03/2017 211 (H) 40 - 150 U/L Final     ALT   Date Value Ref Range Status   10/03/2017 21 0 - 50 U/L Final     AST   Date Value Ref Range Status   10/03/2017 21 0 - 45 U/L Final                Color Urine (no units)   Date Value   06/01/2015 Red     Appearance Urine (no units)   Date Value   06/01/2015 Cloudy     Glucose Urine (mg/dL)   Date Value   06/01/2015 30 (A)     Bilirubin Urine (no units)   Date Value   06/01/2015 Negative     Ketones Urine (mg/dL)   Date Value   06/01/2015 Negative     Specific Gravity Urine (no units)   Date Value   06/01/2015 1.026     pH Urine (pH)   Date Value   06/01/2015 8.0 (H)     Protein Albumin Urine (mg/dL)   Date  Value   06/01/2015 >600 (A)     Urobilinogen Urine (EU/dL)   Date Value   10/06/2011 0.2     Nitrite Urine (no units)   Date Value   06/01/2015 Negative     Leukocyte Esterase Urine (no units)   Date Value   06/01/2015 Negative          Assessment & Plan   38 year old female with history of grade II cystocele and NEHA. Discussed that given she is interested in having additional children, would not recommend surgical management. Would rather recommend conservative management first with pelvic floor physical therapy.  - Pelvic floor physical therapy  - LUIS follow up in 6 months    Patient was seen, evaluated and plan was formulated in conjunction with me and I agree with the above.  Jennifer Hutton MD    [facility] [place]      ==========================      Additional Coding Information:    Time spent:  48 minutes spent on the date of the encounter doing chart review, history and exam, documentation and further activities per the note

## 2023-02-15 NOTE — NURSING NOTE
Chief Complaint   Patient presents with     Consult     Urethral diverticulum       Patient Active Problem List   Diagnosis     Obesity     TB lung, latent     High-risk pregnancy in third trimester     Previous  delivery, antepartum condition or complication     Thrombocytopenia (H)     Encounter for triage in pregnant patient     Labor and delivery, indication for care      (vaginal birth after )       No Known Allergies    Current Outpatient Medications   Medication Sig Dispense Refill     acetaminophen (TYLENOL) 325 MG tablet Take 2 tablets (650 mg) by mouth every 4 hours as needed for mild pain or fever (Patient not taking: Reported on 2/15/2023) 60 tablet 1     acetaminophen (TYLENOL) 325 MG tablet Take 2 tablets (650 mg) by mouth every 6 hours as needed for mild pain (Patient not taking: Reported on 2017) 100 tablet 0     docusate sodium (COLACE) 100 MG capsule Take 1 capsule (100 mg) by mouth 2 times daily as needed for constipation (Patient not taking: Reported on 2017) 60 capsule 1     doxylamine (UNISOM) 25 MG TABS tablet Take 1 tablet (25 mg) by mouth At Bedtime (Patient not taking: Reported on 2017) 14 each 0     ferrous sulfate (IRON) 325 (65 FE) MG tablet Take 1 tablet (325 mg) by mouth daily (with breakfast) (Patient not taking: Reported on 2/15/2023) 60 tablet 0     hydrocortisone (ANUSOL-HC) 2.5 % cream Place rectally 2 times daily (Patient not taking: Reported on 2/15/2023) 30 g 0     ibuprofen (ADVIL/MOTRIN) 400 MG tablet Take 1-2 tablets (400-800 mg) by mouth every 6 hours as needed for other (cramping) (Patient not taking: Reported on 2/15/2023) 60 tablet 1     norethindrone (MICRONOR) 0.35 MG per tablet Take 1 tablet (0.35 mg) by mouth daily (Patient not taking: Reported on 2/15/2023) 84 tablet 3     order for DME Maternity Belt order (Patient not taking: Reported on 2017) 1 each 0     Prenatal Vit-Fe Fumarate-FA (SM PRENATAL VITAMINS) 28-0.8 MG TABS  Take 1 tablet by mouth daily (Patient not taking: Reported on 2/15/2023) 60 tablet 3       Social History     Tobacco Use     Smoking status: Never     Smokeless tobacco: Never   Substance Use Topics     Alcohol use: No     Drug use: No       Elsie Medina LPN  2/15/2023  1:42 PM

## 2023-02-17 ENCOUNTER — TELEPHONE (OUTPATIENT)
Dept: UROLOGY | Facility: CLINIC | Age: 38
End: 2023-02-17
Payer: COMMERCIAL

## 2023-02-17 NOTE — TELEPHONE ENCOUNTER
[facility] Call Center    Phone Message    May a detailed message be left on voicemail: yes     Reason for Call: Patient called stating she called physical therapy and they would like a referral sent over from Dr. Hutton. Thank you.    Action Taken: Message routed to:  [facility]rology    Travel Screening: Not Applicable

## 2023-02-20 DIAGNOSIS — N36.1 URETHRAL DIVERTICULUM: ICD-10-CM

## 2023-02-20 DIAGNOSIS — N39.3 STRESS INCONTINENCE: ICD-10-CM

## 2023-02-20 DIAGNOSIS — N81.11 MIDLINE CYSTOCELE: Primary | ICD-10-CM

## 2023-02-20 NOTE — TELEPHONE ENCOUNTER
Orders sent to PT  PT should be calling patient to schedule an appointment    PATITO UrrutiaN, RN  Care Coordinator Urology  [phone]

## 2023-04-06 ENCOUNTER — HOSPITAL ENCOUNTER (OUTPATIENT)
Dept: PHYSICAL THERAPY | Facility: REHABILITATION | Age: 38
Discharge: HOME | End: 2023-04-06
Attending: UROLOGY
Payer: COMMERCIAL

## 2023-04-06 DIAGNOSIS — N36.1 URETHRAL DIVERTICULUM: ICD-10-CM

## 2023-04-06 DIAGNOSIS — N39.3 STRESS INCONTINENCE: ICD-10-CM

## 2023-04-06 DIAGNOSIS — N81.11 MIDLINE CYSTOCELE: ICD-10-CM

## 2023-04-06 PROCEDURE — 97161 PT EVAL LOW COMPLEX 20 MIN: CPT | Mod: GP

## 2023-04-06 PROCEDURE — 97535 SELF CARE MNGMENT TRAINING: CPT | Mod: GP

## 2023-04-06 NOTE — PROGRESS NOTES
[facility]    OUTPATIENT PHYSICAL THERAPY ORTHOPEDIC EVALUATION  PLAN OF TREATMENT FOR OUTPATIENT REHABILITATION  (COMPLETE FOR INITIAL CLAIMS ONLY)  Patient's Last Name, First Name, M.I.  YOB: 1985  Shruthi Gay    Provider s Name:  [facility]   Medical Record No.  [id number]   Start of Care Date:  04/06/23   Onset Date:  11/09/22 (order date 11/9/23, onset day 6 years ago)   Type:     _X__PT   ___OT   ___SLP Medical Diagnosis:  urethral diverticulum, stress incontinence, midline cystocele     PT Diagnosis:  pelvic floor weakness, pelvic floor dysfunction, stress incontinence   Visits from SOC:  1      _________________________________________________________________________________  Plan of Treatment/Functional Goals:  joint mobilization, manual therapy, neuromuscular re-education, ROM, stretching, strengthening     Biofeedback     Goals  Goal Identifier: HEP  Goal Description: pt will demonstrate independence and report compliance with HEP to faciltiate improved independent symptom mgmt.  Target Date: 06/01/23    Goal Identifier: knack technique  Goal Description: pt report compliance with knack technique 100% of the time to reduce episodes of urinary stress incontinence to promote improved QOL.  Target Date: 06/01/23    Goal Identifier: incontinence  Goal Description: pt will report 75% reduction in frequency of stress incontinence to promote improved QOL.  Target Date: 06/01/23    Goal Identifier: pelvic floor strength  Goal Description: pt will demonstrate greater than or equal to 3/5 (laycock scale) pelvic floor strength to facilitate improved ability to manage stress incontinence.  Target Date: 06/01/23                                                Therapy Frequency:  1 time/week  Predicted Duration of Therapy Intervention:  8 weeks    Marie Fung PT                  I CERTIFY THE NEED FOR THESE SERVICES FURNISHED UNDER        THIS PLAN OF TREATMENT AND WHILE UNDER MY CARE     (Physician co-signature of this document indicates review and certification of the therapy plan).                     Certification Date From:  23   Certification Date To:  23    Referring Provider:  Jennifer Hutton MD    Initial Assessment        See Epic Evaluation Start of Care Date: 23 0700   General Information   Type of Visit Initial OP Ortho PT Evaluation   Start of Care Date 23   Referring Physician Jennifer Hutton MD   Patient/Family Goals Statement lessen symptoms (incontinence)   Date of Order 22   Certification Required? Yes   Medical Diagnosis urethral diverticulum, stress incontinence, midline cystocele   Surgical/Medical history reviewed Yes   Body Part(s)   Body Part(s) Pelvic Floor Dysfunction;Lumbar Spine/SI   Presentation and Etiology   Pertinent history of current problem (include personal factors and/or comorbidities that impact the POC) Pt presents to physical therapy for report of urinary leaking with coughing and sneezing intermittently. Has 4 children- 3 delivered vaginally and 1 . Pt notes onset of symptoms ~ 6 years ago. .  Denies leaking with exercise (includin jumping and running). Denies sexual dysfunction or pain. Endorses diagnosis of bladder prolapse with recommendation to not pursue surgery if she is planning on having more children. Pessary was offered but pt declined.   Impairments E. Decreased flexibility;F. Decreased strength and endurance;P. Bowel or bladder problems   Functional Limitations perform activities of daily living   Symptom Location pelvic floor, bladder   How/Where did it occur From insidious onset   Onset date of current episode/exacerbation 22  (order date 23, onset day 6 years ago)   Chronicity Chronic   Pain rating (0-10 point scale) Denies pain   Progression of  "symptoms since onset: Worsened   Prior Level of Function   Prior Level of Function-Mobility independent with all mobility   Current Level of Function   Patient role/employment history A. Employed   Employment Comments teacher aid   Fall Risk Screen   Fall screen completed by PT   Have you fallen 2 or more times in the past year? No   Have you fallen and had an injury in the past year? No   Is patient a fall risk? No   Abuse Screen (yes response referral indicated)   Feels Unsafe at Home or Work/School no   Feels Threatened by Someone no   Does Anyone Try to Keep You From Having Contact with Others or Doing Things Outside Your Home? no   Physical Signs of Abuse Present no   Lumbar Spine/SI Objective Findings   Balance/Proprioception (Single Leg Stance) pelvic instability leandro   Hamstring Flexibility WNL   Hip Flexor Flexibility min loss leandro   Flexion ROM finger tips to floor   Extension ROM WNL   Right Side Bending ROM finger tips to inferior patellar pole   Left Side Bending ROM finger tips to inferior patellar pole   Hip Screen hip PROM: WNL hip flexion, ER and IR   Hip Abduction Strength 4-/5 leandro   Lumbar/Hip/Knee/Foot Strength Comments hip ER: 4-/5 leandro, hip IR: 4+/5   SLR ASLR: pelvic rocking   Observation breathing: decreased abdominal and lateral rib excursion   Posture anterior pelvic tilt in standing   Specific Questions   Specific Questions Pelvic Floor Dysfunction   Pelvic Floor Dysfunction Questions   Regular exercise Yes  (running, jumping rope)   Fluid intake-glasses/day (one glass/cup = 8oz 64 oz or greater   Caffeinated beverages-glasses/day no   How many times do you wake to urinate at night?   1-3   How often do you urinate during the day?   2-3   Can you stop the flow of urine when on the toilet?  Yes   Do you have the sensation that you need to go to the toilet?  Yes   Do you empty your bladder frequently, before you experience the urge to pass urine?  No   Do you have \"triggers\" that make you feel " you can't wait to go to the toilet?  No   Number of bladder infections last year?  no   Frequency of bowel movements:  1 x/daily   Pelvic Floor Dysfunction Comments sometimes strains with bowel movements-hx of hemorrhoid   Pelvic Floor Dysfunction Objective Findings   Power (MMT at Levator Ani) 2   Endurance (Up to 10 seconds as long as still 50% power) 5   Repetitions (Contract 10 seconds or MVC, rest 4 seconds and count max number of reps) 9   Fast Twitch (Number of 1 second contractions can do in 10 seconds. Norm=7 reps) 6   Elevation (Able to lift posterior vaginal wall toward head and pubic bone) Present   Observation pelvic floor contraction: anal nod and clitoral wink observed, bear down/bulge: pelvic floor descent, grade II anterior vaginal bulge, cough: pelvic floor descent   Type of Storage Problem stress incontinence   Planned Therapy Interventions   Planned Therapy Interventions joint mobilization;manual therapy;neuromuscular re-education;ROM;stretching;strengthening   Planned Modality Interventions   Planned Modality Interventions Biofeedback   Clinical Impression   Criteria for Skilled Therapeutic Interventions Met yes, treatment indicated   PT Diagnosis pelvic floor weakness, pelvic floor dysfunction, stress incontinence   Influenced by the following impairments pelvic floor weakness, hx of 3 vaginal deliveries and 1 , dysfunctional breathing pattern and pressure management   Functional limitations due to impairments sneeze and cough without leaking urine   Clinical Presentation Stable/Uncomplicated   Clinical Presentation Rationale presents as expected per chart review   Clinical Decision Making (Complexity) Low complexity   Therapy Frequency 1 time/week   Predicted Duration of Therapy Intervention (days/wks) 8 weeks   Risk & Benefits of therapy have been explained Yes   Patient, Family & other staff in agreement with plan of care Yes   Pelvic Health Informed Consent Statement Discussed with  patient/guardian reason for referral regarding pelvic health needs and external/internal pelvic floor muscle examination.  Opportunity provided to ask questions and verbal consent for assessment and intervention was given.   Clinical Impression Comments Pt presents to physical therapy for stress incontinence. Pt demonstrates decreased pelvic floor power and endurance, dysfunction breathing patterns and poor pressure management. Pt has been diagnosed with a grade 2 cystocele, and has been offered a pessary in the past but declined. Pt would benefit from skilled physical therapy to address limitations to promote improved QOL and reduced risk of prolapse worsening.   ORTHO GOALS   PT Ortho Eval Goals 1;2;3;4   Ortho Goal 1   Goal Identifier HEP   Goal Description pt will demonstrate independence and report compliance with HEP to faciltiate improved independent symptom mgmt.   Target Date 06/01/23   Ortho Goal 2   Goal Identifier knack technique   Goal Description pt report compliance with knack technique 100% of the time to reduce episodes of urinary stress incontinence to promote improved QOL.   Target Date 06/01/23   Ortho Goal 3   Goal Identifier incontinence   Goal Description pt will report 75% reduction in frequency of stress incontinence to promote improved QOL.   Target Date 06/01/23   Ortho Goal 4   Goal Identifier pelvic floor strength   Goal Description pt will demonstrate greater than or equal to 3/5 (laycock scale) pelvic floor strength to facilitate improved ability to manage stress incontinence.   Target Date 06/01/23   Total Evaluation Time   PT Eval, Low Complexity Minutes (70467) 45   Therapy Certification   Certification date from 04/06/23   Certification date to 06/01/23   Medical Diagnosis urethral diverticulum, stress incontinence, midline cystocele     Marie Fung, PT      Patient was seen, evaluated and plan was formulated in conjunction with me and I agree with the above.  Jennifer Hutton,  MD

## 2023-04-13 NOTE — ADDENDUM NOTE
Encounter addended by: Marie Fung, PT on: 4/13/2023 12:02 PM   Actions taken: Charge Capture section accepted

## 2023-04-14 ENCOUNTER — HOSPITAL ENCOUNTER (OUTPATIENT)
Dept: PHYSICAL THERAPY | Facility: REHABILITATION | Age: 38
Discharge: HOME | End: 2023-04-14
Attending: UROLOGY
Payer: COMMERCIAL

## 2023-04-14 DIAGNOSIS — N81.11 MIDLINE CYSTOCELE: ICD-10-CM

## 2023-04-14 DIAGNOSIS — N36.1 URETHRAL DIVERTICULUM: Primary | ICD-10-CM

## 2023-04-14 DIAGNOSIS — N39.3 STRESS INCONTINENCE: ICD-10-CM

## 2023-04-14 PROCEDURE — 97112 NEUROMUSCULAR REEDUCATION: CPT | Mod: GP

## 2023-04-14 PROCEDURE — 97110 THERAPEUTIC EXERCISES: CPT | Mod: GP

## 2023-04-22 ENCOUNTER — HEALTH MAINTENANCE LETTER (OUTPATIENT)
Age: 38
End: 2023-04-22

## 2023-05-26 PROBLEM — N36.1 URETHRAL DIVERTICULUM: Status: ACTIVE | Noted: 2023-05-26

## 2023-05-26 PROBLEM — N81.11 MIDLINE CYSTOCELE: Status: ACTIVE | Noted: 2023-05-26

## 2023-05-26 PROBLEM — N39.3 STRESS INCONTINENCE: Status: ACTIVE | Noted: 2023-05-26

## 2023-06-02 ENCOUNTER — THERAPY VISIT (OUTPATIENT)
Dept: PHYSICAL THERAPY | Facility: REHABILITATION | Age: 38
End: 2023-06-02
Payer: COMMERCIAL

## 2023-06-02 DIAGNOSIS — N36.1 URETHRAL DIVERTICULUM: Primary | ICD-10-CM

## 2023-06-02 DIAGNOSIS — N39.3 STRESS INCONTINENCE: ICD-10-CM

## 2023-06-02 DIAGNOSIS — N81.11 MIDLINE CYSTOCELE: ICD-10-CM

## 2023-06-02 PROCEDURE — 97535 SELF CARE MNGMENT TRAINING: CPT | Mod: GP

## 2023-06-02 NOTE — ADDENDUM NOTE
Encounter addended by: Marie Fung, PT on: 6/2/2023 10:52 AM   Actions taken: Document created, Document edited

## 2023-06-02 NOTE — ADDENDUM NOTE
Encounter addended by: Marie Fung, PT on: 6/2/2023 10:44 AM   Actions taken: Clinical Note Signed, Flowsheet accepted

## 2023-06-02 NOTE — PROGRESS NOTES
DISCHARGE  Reason for Discharge: Patient has met all goals.  Patient chooses to discontinue therapy.    Equipment Issued: n/a    Discharge Plan: Patient to continue home program.    Referring Provider:  Jennifer Hutton       06/02/23 0500   Appointment Info   Signing clinician's name / credentials Marie Fung, PT, DPT   Total/Authorized Visits 8   Visits Used 3   Medical Diagnosis urethral diverticulum, stress incontinence, midline cystocele   PT Tx Diagnosis pelvic floor weakness, pelvic floor dysfunction, stress incontinence   Quick Adds Certification   Progress Note/Certification   Start of Care Date 04/06/23   Onset of illness/injury or Date of Surgery 11/09/22   Therapy Frequency 1x/week   Predicted Duration up to 8 weeks   Certification date from 04/06/23   Certification date to 06/02/23   Progress Note Due Date 06/02/23   GOALS   PT Goals 2;3;4   PT Goal 1   Goal Identifier HEP   Goal Description pt will demonstrate independence and report compliance with HEP to faciltiate improved independent symptom mgmt.   Rationale to maximize safety and independence with performance of ADLs and functional tasks   Goal Progress met   Target Date 06/02/23   Date Met 06/02/23   PT Goal 2   Goal Identifier knack technique   Goal Description pt report compliance with knack technique 100% of the time to reduce episodes of urinary stress incontinence to promote improved QOL.   Rationale to maximize safety and independence with self cares;to maximize safety and independence with performance of ADLs and functional tasks   Goal Progress met   Target Date 06/02/23   Date Met 06/02/23   PT Goal 3   Goal Identifier incontinence   Goal Description pt will report 75% reduction in frequency of stress incontinence to promote improved QOL.   Rationale to maximize safety and independence with performance of ADLs and functional tasks;to maximize safety and independence with self cares   Goal Progress met   Target Date 06/02/23   Date  Met 06/02/23   PT Goal 4   Goal Identifier pelvic floor strength   Goal Description pt will demonstrate greater than or equal to 3/5 (laycock scale) pelvic floor strength to facilitate improved ability to manage stress incontinence.   Rationale to maximize safety and independence with performance of ADLs and functional tasks;to maximize safety and independence with self cares   Goal Progress not assessed (pt declining internal exam)   Target Date 06/02/23   Date Met 06/02/23   Subjective Report   Subjective Report Denies any leaking over the past few weeks, even when she was sick and coughing a lot. reports she is happy with her progress and ready to perform HEP independently.   Objective Measures   Objective Measures Objective Measure 1;Objective Measure 2   Treatment Interventions (PT)   Interventions Self Care/Home Management   Self Care/home Management   ADL/Home Mgmt Training (77819) 12   Skilled Intervention finalized HEP and plan for independent mgmt of symptoms to improve autonomy of care   Patient Response/Progress receptive   Self Care 1 reviewed goals, discussed plan for independent mgmt   Self Care 1 - Details reviewed therapy goals, discussed progress, reviewed importance of continuing with HEP, emphasized continuation of stress incontinence suppression techniques as well as the importance of pressure management.   Education   Learner/Method Patient   Plan   Home program ptrx   Comments   Comments Pt has attended 3 physical therapy visits for stress urinary incontinence. Pt is meeting all therapy goals, reporting no episodes of NEHA even while sick and coughing frequently last week. Pt appears satisfied with the progress made throughout the coures of therapy and motivated to continue with HEP independently. Pt is appropriate for d/c from skilled physical therapy today.   Total Session Time   Timed Code Treatment Minutes 12   Total Treatment Time (sum of timed and untimed services) 12   Medicare Claim  Information   Medical Diagnosis urethral diverticulum, stress incontinence, midline cystocele   PT Diagnosis pelvic floor weakness, pelvic floor dysfunction, stress incontinence   Start of Care Date 04/06/23   Onset date of current episode/exacerbation 11/09/22  (order date 11/9/23, onset day 6 years ago)   Certification date from 04/06/23   Ortho Goal 1   Goal Identifier HEP   Goal Description pt will demonstrate independence and report compliance with HEP to faciltiate improved independent symptom mgmt.   Target Date 06/01/23   Goal Progress in progress   Ortho Goal 2   Goal Identifier knack technique   Goal Description pt report compliance with knack technique 100% of the time to reduce episodes of urinary stress incontinence to promote improved QOL.   Target Date 06/01/23   Goal Progress in progress   Ortho Goal 3   Goal Identifier incontinence   Goal Description pt will report 75% reduction in frequency of stress incontinence to promote improved QOL.   Target Date 06/01/23   Goal Progress met, keep for consistency   Ortho Goal 4   Goal Identifier pelvic floor strength   Goal Description pt will demonstrate greater than or equal to 3/5 (laycock scale) pelvic floor strength to facilitate improved ability to manage stress incontinence.   Target Date 06/01/23   Goal Progress in progress     Marie Fung, PT

## 2023-06-02 NOTE — PROGRESS NOTES
[facility]                                                                                   OUTPATIENT PHYSICAL THERAPY    PLAN OF TREATMENT FOR OUTPATIENT REHABILITATION   Patient's Last Name, First Name, Shruthi Sosa YOB: 1985   Provider's Name   [facility]   Medical Record No.  [id number]     Onset Date: (P) 11/09/22  Start of Care Date: (P) 04/06/23     Medical Diagnosis:  (P) urethral diverticulum, stress incontinence, midline cystocele      PT Treatment Diagnosis:  (P) pelvic floor weakness, pelvic floor dysfunction, stress incontinence Plan of Treatment  Frequency/Duration: (P) 1x/week/ (P) up to 8 weeks; extended by 1 day to accomodate 1 final visit    Certification date from (P) 04/06/23 to (P) 06/02/23         See note for plan of treatment details and functional goals     Marie Fung, PT                         I CERTIFY THE NEED FOR THESE SERVICES FURNISHED UNDER        THIS PLAN OF TREATMENT AND WHILE UNDER MY CARE .             Physician Signature               Date    X_____________________________________________________                    Referring Provider:  Jennifer Hutton      Initial Assessment  See Epic Evaluation- Start of Care Date: (P) 04/06/23            PLAN  Continue therapy per current plan of care.  Extend by 1 day to accommodate 1 additional visit    Beginning/End Dates of Progress Note Reporting Period:  (P) 04/14/23 to 04/14/2023    Referring Provider:  Jennifer Hutton     Patient was seen, evaluated and plan was formulated in conjunction with me and I agree with the above.  Jennifer Hutton MD

## (undated) RX ORDER — LIDOCAINE HYDROCHLORIDE 10 MG/ML
INJECTION, SOLUTION EPIDURAL; INFILTRATION; INTRACAUDAL; PERINEURAL
Status: DISPENSED
Start: 2020-12-09